# Patient Record
Sex: FEMALE | Race: WHITE | NOT HISPANIC OR LATINO | URBAN - METROPOLITAN AREA
[De-identification: names, ages, dates, MRNs, and addresses within clinical notes are randomized per-mention and may not be internally consistent; named-entity substitution may affect disease eponyms.]

---

## 2018-11-16 ENCOUNTER — OFFICE (OUTPATIENT)
Dept: URBAN - METROPOLITAN AREA CLINIC 67 | Facility: CLINIC | Age: 73
End: 2018-11-16

## 2018-11-16 VITALS — SYSTOLIC BLOOD PRESSURE: 145 MMHG | DIASTOLIC BLOOD PRESSURE: 82 MMHG | WEIGHT: 107 LBS | HEIGHT: 60 IN

## 2018-11-16 DIAGNOSIS — D12.2 BENIGN NEOPLASM OF ASCENDING COLON: ICD-10-CM

## 2018-11-16 DIAGNOSIS — K86.2 CYST OF PANCREAS: ICD-10-CM

## 2018-11-16 PROCEDURE — 99203 OFFICE O/P NEW LOW 30 MIN: CPT | Performed by: INTERNAL MEDICINE

## 2018-11-16 NOTE — SERVICEHPINOTES
The patient is a lissa 73-year-old female originally from [place], of [place] ancestry, who was been referred for surveillance colonoscopy--a 1.2 cm AC adenoma was removed.  She also has a pancreatic cyst, seen in 5820-7164 that was 1.6 cm She herself has not had any attacks of pancreatitis nor does she have ongoing symptoms suggestive of chronic pancreatitis. She does have a hx of smoking. We evaluated more carefully with endoscopic ultrasound, which found a benign unilocular cyst 1.7 x 0.8 cm, an MRI in 10/2017 showed no change. Due to her hx of polyps a colonoscopy will be scheduled, and an EUS in the same setting.  The patient is at 110 lbs, down from 130 lbs three years ago due to the Dr. Kelley diet (eating only one meal a day).

## 2022-11-02 ENCOUNTER — HOSPITAL ENCOUNTER (INPATIENT)
Dept: HOSPITAL 54 - ER | Age: 77
LOS: 3 days | Discharge: NURSING, REHAB OR LONG-TERM CARE | DRG: 542 | End: 2022-11-05
Attending: INTERNAL MEDICINE | Admitting: NURSE PRACTITIONER
Payer: MEDICARE

## 2022-11-02 VITALS — BODY MASS INDEX: 23.56 KG/M2 | HEIGHT: 60 IN | WEIGHT: 120 LBS

## 2022-11-02 DIAGNOSIS — Z74.09: ICD-10-CM
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
## 2022-11-02 DIAGNOSIS — I10: ICD-10-CM

## 2022-11-02 DIAGNOSIS — J44.9: ICD-10-CM

## 2022-11-02 DIAGNOSIS — Z91.81: ICD-10-CM

## 2022-11-02 DIAGNOSIS — Z88.8: ICD-10-CM

## 2022-11-02 DIAGNOSIS — E78.5: ICD-10-CM

## 2022-11-02 DIAGNOSIS — Z88.1: ICD-10-CM

## 2022-11-02 DIAGNOSIS — M48.55XA: Primary | ICD-10-CM

## 2022-11-02 DIAGNOSIS — N17.0: ICD-10-CM

## 2022-11-02 DIAGNOSIS — F32.A: ICD-10-CM

## 2022-11-02 DIAGNOSIS — R26.89: ICD-10-CM

## 2022-11-02 DIAGNOSIS — Z79.899: ICD-10-CM

## 2022-11-02 DIAGNOSIS — M40.205: ICD-10-CM

## 2022-11-02 DIAGNOSIS — Z87.891: ICD-10-CM

## 2022-11-02 DIAGNOSIS — K21.9: ICD-10-CM

## 2022-11-02 LAB
BASOPHILS # BLD AUTO: 0.1 K/UL (ref 0–0.2)
BASOPHILS NFR BLD AUTO: 0.7 % (ref 0–2)
BUN SERPL-MCNC: 24 MG/DL (ref 7–18)
CALCIUM SERPL-MCNC: 9.4 MG/DL (ref 8.5–10.1)
CHLORIDE SERPL-SCNC: 101 MMOL/L (ref 98–107)
CO2 SERPL-SCNC: 29 MMOL/L (ref 21–32)
CREAT SERPL-MCNC: 1.3 MG/DL (ref 0.6–1.3)
EOSINOPHIL NFR BLD AUTO: 0.6 % (ref 0–6)
GLUCOSE SERPL-MCNC: 104 MG/DL (ref 74–106)
HCT VFR BLD AUTO: 35 % (ref 33–45)
HGB BLD-MCNC: 11.3 G/DL (ref 11.5–14.8)
LYMPHOCYTES NFR BLD AUTO: 0.8 K/UL (ref 0.8–4.8)
LYMPHOCYTES NFR BLD AUTO: 9 % (ref 20–44)
MCHC RBC AUTO-ENTMCNC: 32 G/DL (ref 31–36)
MCV RBC AUTO: 89 FL (ref 82–100)
MONOCYTES NFR BLD AUTO: 0.6 K/UL (ref 0.1–1.3)
MONOCYTES NFR BLD AUTO: 6.5 % (ref 2–12)
NEUTROPHILS # BLD AUTO: 7.2 K/UL (ref 1.8–8.9)
NEUTROPHILS NFR BLD AUTO: 83.2 % (ref 43–81)
PLATELET # BLD AUTO: 242 K/UL (ref 150–450)
POTASSIUM SERPL-SCNC: 3.9 MMOL/L (ref 3.5–5.1)
RBC # BLD AUTO: 3.95 MIL/UL (ref 4–5.2)
SODIUM SERPL-SCNC: 137 MMOL/L (ref 136–145)
WBC NRBC COR # BLD AUTO: 8.7 K/UL (ref 4.3–11)

## 2022-11-02 PROCEDURE — G0378 HOSPITAL OBSERVATION PER HR: HCPCS

## 2022-11-02 PROCEDURE — C9803 HOPD COVID-19 SPEC COLLECT: HCPCS

## 2022-11-02 RX ADMIN — LIDOCAINE SCH EA: 50 PATCH CUTANEOUS at 20:27

## 2022-11-02 NOTE — NUR
PT BIBRA C/O TAILBONE PAIN S/P TRIP AND FALL WHILE TRYING TO TURN ON LAMP. PT 
AAOX4 BREATHING EVENLY AND UNLABORED. PT REPORTS FALLING ON HER TAILBONE. PT 
USUALLY WALKS WITH A WALKER. - KO. PT ATTACHED TO MONITOR AND POX. MD AT 
BEDSIDE. WILL CONTINUE TO MONITOR

## 2022-11-03 VITALS — SYSTOLIC BLOOD PRESSURE: 140 MMHG | DIASTOLIC BLOOD PRESSURE: 69 MMHG

## 2022-11-03 VITALS — DIASTOLIC BLOOD PRESSURE: 50 MMHG | SYSTOLIC BLOOD PRESSURE: 110 MMHG

## 2022-11-03 VITALS — SYSTOLIC BLOOD PRESSURE: 151 MMHG | DIASTOLIC BLOOD PRESSURE: 74 MMHG

## 2022-11-03 LAB
BASOPHILS # BLD AUTO: 0 K/UL (ref 0–0.2)
BASOPHILS NFR BLD AUTO: 0.8 % (ref 0–2)
BUN SERPL-MCNC: 26 MG/DL (ref 7–18)
CALCIUM SERPL-MCNC: 8.8 MG/DL (ref 8.5–10.1)
CHLORIDE SERPL-SCNC: 104 MMOL/L (ref 98–107)
CO2 SERPL-SCNC: 30 MMOL/L (ref 21–32)
CREAT SERPL-MCNC: 1.5 MG/DL (ref 0.6–1.3)
EOSINOPHIL NFR BLD AUTO: 2.5 % (ref 0–6)
GLUCOSE SERPL-MCNC: 87 MG/DL (ref 74–106)
HCT VFR BLD AUTO: 30 % (ref 33–45)
HGB BLD-MCNC: 9.6 G/DL (ref 11.5–14.8)
LYMPHOCYTES NFR BLD AUTO: 1 K/UL (ref 0.8–4.8)
LYMPHOCYTES NFR BLD AUTO: 18.4 % (ref 20–44)
MAGNESIUM SERPL-MCNC: 1.9 MG/DL (ref 1.8–2.4)
MCHC RBC AUTO-ENTMCNC: 32 G/DL (ref 31–36)
MCV RBC AUTO: 89 FL (ref 82–100)
MONOCYTES NFR BLD AUTO: 0.6 K/UL (ref 0.1–1.3)
MONOCYTES NFR BLD AUTO: 11.3 % (ref 2–12)
NEUTROPHILS # BLD AUTO: 3.5 K/UL (ref 1.8–8.9)
NEUTROPHILS NFR BLD AUTO: 67 % (ref 43–81)
PHOSPHATE SERPL-MCNC: 3.9 MG/DL (ref 2.5–4.9)
PLATELET # BLD AUTO: 209 K/UL (ref 150–450)
POTASSIUM SERPL-SCNC: 3.5 MMOL/L (ref 3.5–5.1)
RBC # BLD AUTO: 3.31 MIL/UL (ref 4–5.2)
SODIUM SERPL-SCNC: 139 MMOL/L (ref 136–145)
WBC NRBC COR # BLD AUTO: 5.3 K/UL (ref 4.3–11)

## 2022-11-03 RX ADMIN — LIDOCAINE SCH EA: 50 PATCH CUTANEOUS at 20:45

## 2022-11-03 RX ADMIN — SODIUM CHLORIDE PRN MLS/HR: 9 INJECTION, SOLUTION INTRAVENOUS at 09:42

## 2022-11-03 RX ADMIN — SODIUM CHLORIDE PRN MLS/HR: 9 INJECTION, SOLUTION INTRAVENOUS at 22:04

## 2022-11-03 RX ADMIN — BUPROPION HYDROCHLORIDE SCH MG: 150 TABLET, EXTENDED RELEASE ORAL at 10:25

## 2022-11-03 RX ADMIN — GABAPENTIN SCH MG: 100 CAPSULE ORAL at 10:24

## 2022-11-03 RX ADMIN — FOLIC ACID SCH MG: 1 TABLET ORAL at 10:24

## 2022-11-03 RX ADMIN — GABAPENTIN SCH MG: 100 CAPSULE ORAL at 04:15

## 2022-11-03 RX ADMIN — TRAZODONE HYDROCHLORIDE SCH MG: 50 TABLET ORAL at 22:05

## 2022-11-03 RX ADMIN — ATORVASTATIN CALCIUM SCH MG: 10 TABLET, FILM COATED ORAL at 22:05

## 2022-11-03 RX ADMIN — PANTOPRAZOLE SODIUM SCH MG: 40 TABLET, DELAYED RELEASE ORAL at 07:49

## 2022-11-03 RX ADMIN — ATORVASTATIN CALCIUM SCH MG: 10 TABLET, FILM COATED ORAL at 04:15

## 2022-11-03 RX ADMIN — URSODIOL SCH MG: 300 CAPSULE ORAL at 11:10

## 2022-11-03 RX ADMIN — TRAZODONE HYDROCHLORIDE SCH MG: 50 TABLET ORAL at 00:00

## 2022-11-03 RX ADMIN — LOSARTAN POTASSIUM SCH MG: 50 TABLET, FILM COATED ORAL at 09:00

## 2022-11-03 RX ADMIN — CITALOPRAM SCH MG: 20 TABLET ORAL at 10:24

## 2022-11-03 NOTE — NUR
MS RN CLOSING NOTE



PT IN BED, AWAKE, SLEEPING TV AT THIS TME. A/O X3-4. ABLE TO MAKE NEEDS KNOWN. STABLE ON O2 
2 LPM VIA NC. NO SIGNS OF SOB OR LABORED BREATHING. IV LAC #20 RUNNING NS @ 75 ML/HR. 
ADMINISTERED PRESCRIBED MEDICATIONS AND TOLERATED WELL. SAFETY MEASURES MAINTAINED: BED 
LOCKED AND IN LOW POSITION; SIDE RAILS UP X3; CALL LIGHT WITHIN REACH. WILL ENDORSE PARVEEN TO 
NIGHT SHIFT NURSE.

## 2022-11-03 NOTE — NUR
MS RN ADMISSION NOTES



PT ADMITTED TO UNIT VIA [facility] AT 0900 WITH DIAGNOSIS OF INTRACTABLE BACK PAIN. PT IS A/O 
X4. ABLE TO MAKE NEEDS KNOWN. ORIENTED TO STAFF AND ROOM. V/S TAKEN AND RECORDED. PT ON 02 
VIA N/C AT 2LPM, TOLERATING WELL, BREATHING EVEN AND UNLABORED, NO ACUTE RESPIRATORY 
DISTRESS NOTED. SKIN IS INTACT.  LOWER LUMBAR MIDLINE MILD TENDERNESS NOTED WITH LIDOCAINE 
PATCH IN PLACE. IV ACCESS NOTED ON LAC #20G INTACT AND PATENT, IVF OF NS @ 75ML/HR STARTED 
PER MD ORDER. LUNGS CLEAR ON AUSCULTATION. ABDOMEN SOFT, NON-TENDER WITH POSITIVE BOWEL 
SOUNDS PRESENT ON FOUR QUADRANTS. SAFETY PRECAUTIONS IMPLEMENTED: BED IN LOWEST LOCKED 
POSITION, SIDE RAILS UP X2 CALL LIGHT AND TRAY TABLE PLACED WITHIN W/I EASY REACH OF PT. 
WILL CONTINUE TO MONITOR PT

## 2022-11-03 NOTE — NUR
RN NOTES



CALLED [facility] [place] FOR PT'S LUMBAR BACK BRACE PER MD ORDER. OFFICE IS 
CLOSED AND LEFT A MESSAGE. FAXED ORDER TO CENTER AND RECEIVED CONFIRMATION THAT IT WAS 
RECEIVED.

## 2022-11-03 NOTE — NUR
RN OPENING NOTES



RECEIVED PT LAYING IN BED, ASLEEP, AWAKENS TO VERBAL STIMULI. AOx4, ABLE TO MAKE NEEDS 
KNOWN. ON RA AND TOLERATING WELL. NO SOB NOTED. NO S/SX OF RESPIRATORY DISTRESS NOTED. IV 
ACCESS IN LAC #20G RUNNING NS @ 75 ML/HR. SAFETY PRECAUTIONS IN PLACE: BED IN LOWEST, LOCKED 
IN POSITION, SIDERAILS UPx2, AND BRAKES ON. TABLE AND CALL LIGHT WITHIN REACH. ALL NEEDS MET 
AT THIS TIME.

## 2022-11-04 VITALS — SYSTOLIC BLOOD PRESSURE: 154 MMHG | DIASTOLIC BLOOD PRESSURE: 81 MMHG

## 2022-11-04 VITALS — DIASTOLIC BLOOD PRESSURE: 79 MMHG | SYSTOLIC BLOOD PRESSURE: 138 MMHG

## 2022-11-04 VITALS — DIASTOLIC BLOOD PRESSURE: 75 MMHG | SYSTOLIC BLOOD PRESSURE: 159 MMHG

## 2022-11-04 VITALS — DIASTOLIC BLOOD PRESSURE: 76 MMHG | SYSTOLIC BLOOD PRESSURE: 161 MMHG

## 2022-11-04 RX ADMIN — GABAPENTIN SCH MG: 100 CAPSULE ORAL at 09:28

## 2022-11-04 RX ADMIN — PANTOPRAZOLE SODIUM SCH MG: 40 TABLET, DELAYED RELEASE ORAL at 09:27

## 2022-11-04 RX ADMIN — FOLIC ACID SCH MG: 1 TABLET ORAL at 09:28

## 2022-11-04 RX ADMIN — BUPROPION HYDROCHLORIDE SCH MG: 150 TABLET, EXTENDED RELEASE ORAL at 09:28

## 2022-11-04 RX ADMIN — LOSARTAN POTASSIUM SCH MG: 50 TABLET, FILM COATED ORAL at 09:28

## 2022-11-04 RX ADMIN — Medication PRN OZ: at 11:59

## 2022-11-04 RX ADMIN — CITALOPRAM SCH MG: 20 TABLET ORAL at 09:28

## 2022-11-04 RX ADMIN — URSODIOL SCH MG: 300 CAPSULE ORAL at 09:28

## 2022-11-04 RX ADMIN — SODIUM CHLORIDE PRN MLS/HR: 9 INJECTION, SOLUTION INTRAVENOUS at 21:08

## 2022-11-04 RX ADMIN — PANTOPRAZOLE SODIUM SCH MG: 40 TABLET, DELAYED RELEASE ORAL at 07:30

## 2022-11-04 RX ADMIN — Medication PRN OZ: at 12:00

## 2022-11-04 RX ADMIN — ATORVASTATIN CALCIUM SCH MG: 10 TABLET, FILM COATED ORAL at 21:15

## 2022-11-04 RX ADMIN — TRAZODONE HYDROCHLORIDE SCH MG: 50 TABLET ORAL at 21:16

## 2022-11-04 RX ADMIN — Medication SCH OZ: at 12:02

## 2022-11-04 NOTE — NUR
WOUND CARE CONSULT: PT SEEN FOR SKIN ASSESSMENT AND NOTED TO HAVE SOME DISCOLORATION TO 
SACRAL/BUTTOCKS REGION, PRESENT ON ADMISSION. PT ABLE TO ASSIST WITH TURNING AND 
REPOSITIONING IN BED BUT HAS SOME SORENESS ON RT SIDE. PT ASKING WHEN SHE WILL BE GOING BACK 
TO HER ROOM. PT REORIENTED. DISCUSSED WITH NURSING STAFF AND CHARGE RN. DISCUSSED SKIN 
PROTECTION WITH NURSING STAFF. MD IN AGREEMENT WITH PLAN OF CARE.

## 2022-11-04 NOTE — NUR
ATILIO  NOTES



PANTOPRAZOLE  NOTE  GIVEN  AS  PATIENT HAS A PROCEDURE  AND  ON NPO  AT THIS TIME 

-------------------------------------------------------------------------------

Addendum: 11/04/22 at 0924 by NATHAN HURTADO RN

-------------------------------------------------------------------------------

WRONG  DOCUMENTATION   PATIENT  NOT ON NPO  MEDS  GIVEN  AS ORDERED

## 2022-11-04 NOTE — NUR
Consult 



KERON received a consult request a fall at home. Pt. is a 77-year-old white female who was 
admitted for back pain. KERON met with pt. at bedside. Pt. is alert and oriented x4 and 
appeared well groomed with a full affect. KERON confirmed pt. contact information and confirmed 
that her emergency contact is her neighbor, Wally. Pt. lives alone with her cat and does not 
have a caregiver. Per pt. report she wants to go to a longterm but wants her neighbor to assist 
her in connecting her with an longterm. Pt. reports that she is independent with her ADLs and 
her neighbor Wally helps her feed her cat and clean her home. Pt. reports she uses a walker. 
Pt. receives [employer] and SSI. Pt. states that she was a nicotine smoker for 40 years but 
has stopped and does not drink alcohol due to medication. Pt. stated that she feels 
depressed and is receiving medication for it (Bupropion and citalopram). Pt. denies having 
hallucinations. KERON assessed for suicidal and homicidal ideation in which pt. denied plan, 
means, or intent. .



DC plan: When KERON asked pt.s plan after being discharged, pt. responded [place] 
[place] [address] [place] [address]. KERON offered pt. longterm and SNF placement in 
which the pt. refused due to stating that her neighbor Wally was helping her with placement. 
KERON encouraged pt. to connect with an JORGE LUIS. KERON offered pt. with a senior resource guide, 
mental health resources, caregiving guides, and transportation resources in which pt. 
accepted them. KERON discussed with nurse. 



ABUSE PREVENTION: 

ELDER ABUSE HOTLINE (24/7) [phone]

ADULT PROTECTIVE SERVICES HOTLINE [phone]

LONG-TERM CARE [facility] [phone]  [facility]

AREA ON AGING (HOTLINE) [phone]



ADULT DAY HEALTH CARE CARE CENTERS: 

Private pay or Medi-kurt funded adult day care

 [facility] [phone]

 [facility] [phone], [facility] [phone], [facility] [phone], [facility] Adult Day Health Care [phone], [place]

[facility] Day Health Care [phone], [facility]  [facility] [phone], [place]

[facility] [phone], [place]

[facility] Adult Center [phone], [place]



ALZHEIMERS DISEASE/DEMENTIA: 

Alzheimers Association Helpline (436) 488-4494

 [facility] (060) 010-1967 www.alz.org/[facility] Department of Aging (682) 324-0868 www.lacity.org

 Family Caregiver [place] (946) 197-6466 www.caregiver.org

 [place] Caregiver Resources Center/Family Support (056) 049-9452 www.[facility].org

CANCER RESOURCES: 

American Cancer Society (036) 590-7338 www.cancer.org

 Cancer Support Community (443) 574-2645 www.CancerSupportVvsb.org: CancerCare (673) 466-2656 www.cancercare.org

 [facility] Cancer Support Center (839) 085-2725 www.[facility].org



COMMUNITY HEALTH ASSOCIATIONS:

AARP (636) 096-7585 www.aarp.org

 ALS Association (597) 207-2038(612) 977-1204 (717) 917-4756 (ask for Tatiana) www.als.org

 American Diabetes Association (814) 697-9170 www.diabetes.org

 American Heart Association (831) 176-2952 www.heart.org

 American Lung Association (278) 884-1827 www.lungusa.org

 American Parkinson Disease Association (874) 258-3609 www.apdaparkinson.org

 American [place] (267) 746-8534, (562) 620-2674 www.redcross.org

 Arthritis Foundation (327) 052-1656 www.arthritis.org

 Crohns & Colitis Foundation of American (202) 536-4620 www.ccfa.org/chapters/earle

 National Multiple Sclerosis Society (470) 621-5997 www.nationalmssociety.org

 Myasthenia Gravis Foundation (943) 003-8814 www.myasthenia-ca.org

 National Stroke Association (449) 933-8334 www.stroke.org



CONSERVATORSHIP & GUARDIANSHIP:

AARP (939) 311-1725

 [place] Legal Services (679) 620-7560

 Center for Health Care Rights (192) 745-7610

 Eldercare Information and Referral (003) 018-7661

  Foundation (620) 317-2020



[facility]: 

[facility] Referral Service [phone]

[facility] Legal Services [phone]

Office of the Public Guardian [place] [phone]



EYESIGHT DISORDER RESOURCES:

American Macular Degeneration Foundation (414) 142-2663

[facility] (739) 766-2966 www.[facility].org



GRIEF AND BEREAVEMENT RESOURCES:

 [facility] [phone], [facility] [phone]

 THE [facility] [phone], [facility] [phone]

[facility] [phone], [place]



HEARING DISORDER RESOURCES:

[place] Telephone Access Program Deaf and Disabled Telecommunications Program (854) 807-9305 www.ddtp.[facility].ca.gov

HearRx Hearing Centers ([place]) (607) 326-2412

Better Hearing Systems [phone] 179-5679, [place]

GLAD ([facility] Agency on Deafness) (343) 839-5678 V/ TTY;

Hearing Aid Specialist [phone], [facility] -low income hearing aid assistance (898) 287-0194 
www.Cord Projecthearingfoundation.org 

[facility] [phone], [place]



HELP AT HOME  CAREGIVER SUPPORT:

 In Home Support Services  (Must have Medi-Kurt to be eligible)

([phone] 962-9168 *Ask for a list of agencies that provide services to assist with care in the 
home.  Local Senior Centers also have listings of care providers.



HOME SAFETY MODIFICATIONS AND EQUIPMENT:

Senior centers have additional referrals.

[place] Housing and Community Investment Dept. Handyworker Program (low income) (992) 607-9700 or 
(890) 165-3902 Visit http://hcidla.lacity.org/low-income-sr for more information

National Seating and Mobility (437)430-6725 and/or (210)990-6947;

Forever Active (046) 930-1811 www.foreveractivemed.com

Stay Home Safe (625) 790-8285  www.Stayhomesafe.[employer]ation Biotechnologies

LIFE ALERT RESPONSE SYSTEM:

Ideal Binary Services 495-154-0930 www. stylefruits

Life Alert 276-172-5799 www.RamTiger Fitness

Life Station 983-689-7716 www.Triplify.[employer]otechnologies

Safe Return 522-790-4615 www.alz.or/safereturn

Cell Phones for Seniors www.nlygz0hjdmkzsunf.com



MEALS AND FOOD PROGRAMS:

[place] Meals on Wheels [phone]

[place] Meals on Wheels [phone]

[facility] [phone]

[place] to the Homebound [phone]

[place] to the Homebound [phone]

[facility] to the Homebound [phone]

[facility] to the Homebound [phone]

[facility] [phone]

[facility] [phone]

ONE Generation [phone]

[facility] [phone]

[facility] [phone]

Meals on Wheels [phone] For all ages: $6.85/ meal w side. Delivered M-F from 10 am-1pm. 
Application and payment is done over the phone. Frozen meals available for weekends. 

Emergency Food Coalition [phone] [phone]

[facility]  [phone]

[facility] [phone]

[facility] Brown bag lunches [phone]

[facility] [phone]



MEAL/GROCERY DELIVERY PROGRAMS:

[facility] Gourmet Meals [phone]- [facility]

[phone]- [facility]

Magic Kitchen [phone]

Moms Meals [phone] (ask Andrea for Discount

***Select grocery stores may provide delivery. 



MEDICAL INSURANCE SUPPORT SERVICES:

Center for Health Care Rights [phone]

Health Insurance Counseling/Advocacy Programs (HICAP)-Must have Medicare. Offers counseling 
for Medi-Kurt eligibility [phone]

Department of Public  703-492-0174 www.[facility].ca.gov

Medicare 217-071-7190 www.socialsecurity.org

Social Security 134-633-4145



SENIOR ACTIVITY PROGRAMS:

*Contact a local senior center, adult school, recreation facility or community college for 
education, fitness, recreation, and social programs. 

Aquatic Therapy and Adapted Exercise programs through [facility] [phone]

Encore at [facility] [phone] www.[facility]/encore

U- Senior Friends [phone]

[place] Senior Programs 508-574-2199 www.oasisnet.org

Suddenly 65 [phone] www.phgemexj15.com



SENIOR CENTERS:

[facility] [phone]

[facility] [phone]

[facility] [phone]

[facility] [phone]

[facility] [phone]

[facility] [phone]

[facility] [phone]

[facility] [phone]

One Generation, [facility] [phone]

[facility] [phone]

[facility] [phone]

[facility] [phone]

[facility] [phone]



TRANSPORTATION:

Local [facility] Centers may have applications for transportation programs and additional 
resources. 

ACCESS Services [phone] Transportation for seniors and disabled persons 7 days a week 

requiring 254 hr. advance reservation. Must apply and register for program erika eligible. 

CITY RIDE 243-372-3146 or [phone] Transportation for seniors and persons with ADA 
card/metro disabled card in the [facility]. M-F only. Must register for services. 

ONE GENERATION  541.424.4122 Serves 65 years + in conjunction with city ride program. Must 
be registered with both programs.

A to B Transport [phone] Provides wheelchair/gurney van service.

Adult Medical Transport [phone] Accepts [facility]-kurt with prior authorization. 

Care Van [phone] Provides wheelchair Transport. 

City Wide Transportation [phone] Provides gurney service

Gentle Care [phone] Gurney Transport.

[facility] Transportation [phone] wheelchair & gurney transport

[facility] Transportation [phone] wheelchair & gurney transport

[place] Non-Emergency Transport [phone] wheelchair & gurney transport

[facility] [place] [phone] Short Term Transportation primarily for adults with   
disabilities on social security income. Nominal fee may apply and a reservation is required. 


City Cab [phone] or [phone]

[facility] [phone]

[address] [place] [address] Referral Services -[phone] For additional programs & services 

VETERANS RESOURCES:

Submissions for Aid and Attendance should be done directly to [facility] office locatd at : 
[address] [address] [phone]

National Caregiver Support Line [phone]

[facility] Veterans Services Field Office [phone]

[place] Department of  Affairs [phone]

Pension Information [phone]

## 2022-11-04 NOTE — NUR
RN CLOSING NOTES



PT LAYING IN BED, ASLEEP, AWAKENS TO VERBAL STIMULI. AOx4, ABLE TO MAKE NEEDS KNOWN. ON RA 
AND TOLERATING WELL. NO SOB NOTED. NO S/SX OF RESPIRATORY DISTRESS NOTED. IV ACCESS IN LAC 
#20G RUNNING NS @ 75 ML/HR. ALL ORDERS CARRIED OUT. ALL NEEDS MET. PT KEPT CLEAN AND DRY. 
SAFETY PRECAUTIONS IN PLACE: BED IN LOWEST, LOCKED IN POSITION, SIDERAILS UPx2, AND BRAKES 
ON. TABLE AND CALL LIGHT WITHIN REACH. WILL ENDORSE TO ONCOMING SHIFT FOR PARVEEN.

## 2022-11-04 NOTE — NUR
RN  CLOSING   NOTES



 PATIENT  ON BED AWAKE . AOx3 , ABLE TO MAKE NEEDS KNOWN. ON RA AND TOLERATING WELL. NO SOB 
NOTED. NO S/S NOTED. NO C/O  OF PAIN AND DISCOMFORT  , ALL DUE MEDS  GIVEN  AS ORDERED, SEEN 
  BY WOUND  NURSE  AND  WITH ORDER   OF  Z  GUARD  ,  BACK  BRACE   WAS  DELIVERED  FROM  
avox  ,  SEEN  BY  PT  AND  ABLE  TO  AMBULATE  WITH  FWW  FRO M BED  TO  BATHROOM 
 ,    IV ACCESS IN LAC #20G RUNNING NS @ 75 ML/HR. SAFETY PRECAUTIONS IN PLACE: BED IN 
LOWEST, LOCKED IN POSITION, SIDERAILS UPx2, AND BRAKES ON. TABLE AND CALL LIGHT WITHIN 
REACH. ALL NEEDS MET AT THIS TIME.

## 2022-11-04 NOTE — NUR
RN OPENING NOTES



RECEIVED PT  IN BED, ASLEEP, AWAKENS TO VERBAL STIMULI. AOx3 , ABLE TO MAKE NEEDS KNOWN. ON 
RA AND TOLERATING WELL. NO SOB NOTED. NO S/S NOTED. NO C/O  OF PAIN AND DISCOMFORT  ,  IV 
ACCESS IN LAC #20G RUNNING NS @ 75 ML/HR. SAFETY PRECAUTIONS IN PLACE: BED IN LOWEST, LOCKED 
IN POSITION, SIDERAILS UPx2, AND BRAKES ON. TABLE AND CALL LIGHT WITHIN REACH. ALL NEEDS MET 
AT THIS TIME.

## 2022-11-04 NOTE — NUR
RECEIVED PATIENT IN BED, ALERT/ORIENTED X4, 2LPM VIA NC, NO COMPLAIN OF BACK PAIN, 
REPOSITIONED FOR COMFORT, KEPT SAFE, WILL CONTINUE TO MONITOR.

## 2022-11-05 VITALS — DIASTOLIC BLOOD PRESSURE: 72 MMHG | SYSTOLIC BLOOD PRESSURE: 148 MMHG

## 2022-11-05 VITALS — SYSTOLIC BLOOD PRESSURE: 148 MMHG | DIASTOLIC BLOOD PRESSURE: 72 MMHG

## 2022-11-05 RX ADMIN — PANTOPRAZOLE SODIUM SCH MG: 40 TABLET, DELAYED RELEASE ORAL at 10:06

## 2022-11-05 RX ADMIN — Medication SCH OZ: at 14:32

## 2022-11-05 RX ADMIN — CITALOPRAM SCH MG: 20 TABLET ORAL at 10:03

## 2022-11-05 RX ADMIN — FOLIC ACID SCH MG: 1 TABLET ORAL at 10:03

## 2022-11-05 RX ADMIN — URSODIOL SCH MG: 300 CAPSULE ORAL at 10:02

## 2022-11-05 RX ADMIN — LOSARTAN POTASSIUM SCH MG: 50 TABLET, FILM COATED ORAL at 10:03

## 2022-11-05 RX ADMIN — BUPROPION HYDROCHLORIDE SCH MG: 150 TABLET, EXTENDED RELEASE ORAL at 10:03

## 2022-11-05 RX ADMIN — GABAPENTIN SCH MG: 100 CAPSULE ORAL at 10:03

## 2022-11-05 NOTE — NUR
PATIENT IN BED, ALERT/ORIENTED X3, WITH CONFUSION, FORGETFUL. 2LPM VIA NC, NO RESPIRATORY 
DISTRESS, S/P FALL AT HOME WITH LOW BACK PAIN, NO COMPLAIN OF PAIN AT REST, LUMBAR BRACE, 
AMBULATES USING FWW WITH ASSISTANCE, CONTINUE HYDRATION, NS AT 75 ML/HR, FALL PRECAUTION, 
KEPT SAFE, WILL CONTINUE TO MONITOR.

## 2022-11-05 NOTE — NUR
RN   DISCHARGE  NOTES



 PT  IN BED, AWAKE. AOx3 , ABLE TO MAKE NEEDS KNOWN. ON RA AND TOLERATING WELL. NO SOB  OR 
DISTRESS   NOTED  . NO C/O  OF PAIN AND DISCOMFORT  , ALL   DUE  MEDS GIVEN  AS ORDERED  ,   
WITH ORDER  FOR  DISCHARGE  TO  [facility]  REHAB  AND   DISCHARGE  PAPERS WERE 
PREPARED   AND INSTRUCTIONS   PROVIDED   TO  THE  PATIENT  AND UNDERSTOOD  ,  REPORT GIVEN  
TO  THE  RN  IN  FACILITY  - MICHELLE TRIMBLE  .  ALL BELONGINGS  WERE  ACCOUNTED  FOR  AND  FORM 
WAS  SIGNED  BY THE  PATIENT  ,  ALSO  REPORT  GIVEN TO THE  AMBULANCE PERSONNEL  AND  
PATIENT LEFT  WITH  NO  SOB OR DISTRESS  NOTED  AND  NO  C/O  OF PAIN AND  DISCOMFORT  ,  
SHASHI  VIA  AMBULANCE   ACCOMPANIED  BY  AMBULANCE PERSONNEL  ,  IV  ACCESS  WAS  RMEOVED  
AND  AND ID BAND   .  PATIENT   LEFT  MEDICALLY STABLE

## 2022-11-05 NOTE — NUR
RN OPENING NOTES



RECEIVED PT  IN BED, AWAKE. AOx3 , ABLE TO MAKE NEEDS KNOWN. ON RA AND TOLERATING WELL. NO 
SOB  OR DISTRESS   NOTED  . NO C/O  OF PAIN AND DISCOMFORT  ,  IV ACCESS IN RIGHT   HAND   
#22  G RUNNING NS @ 75 ML/HR. SAFETY PRECAUTIONS IN PLACE: BED IN LOWEST, LOCKED IN 
POSITION, SIDERAILS UPx2, AND BRAKES ON. TABLE AND CALL LIGHT WITHIN REACH. ALL NEEDS MET AT 
THIS TIME. WILL  CONTINUE  TO MONITOR

## 2023-06-27 ENCOUNTER — HOSPITAL ENCOUNTER (INPATIENT)
Dept: HOSPITAL 12 - REHABOV3 | Age: 78
LOS: 16 days | Discharge: HOME HEALTH | DRG: 559 | End: 2023-07-13
Attending: PHYSICAL MEDICINE & REHABILITATION | Admitting: PHYSICAL MEDICINE & REHABILITATION
Payer: MEDICARE

## 2023-06-27 VITALS — WEIGHT: 135 LBS | HEIGHT: 60 IN | BODY MASS INDEX: 26.5 KG/M2

## 2023-06-27 DIAGNOSIS — D68.59: ICD-10-CM

## 2023-06-27 DIAGNOSIS — N39.0: ICD-10-CM

## 2023-06-27 DIAGNOSIS — R26.81: ICD-10-CM

## 2023-06-27 DIAGNOSIS — Z91.81: ICD-10-CM

## 2023-06-27 DIAGNOSIS — K21.9: ICD-10-CM

## 2023-06-27 DIAGNOSIS — Z79.83: ICD-10-CM

## 2023-06-27 DIAGNOSIS — E86.0: ICD-10-CM

## 2023-06-27 DIAGNOSIS — B96.20: ICD-10-CM

## 2023-06-27 DIAGNOSIS — D64.9: ICD-10-CM

## 2023-06-27 DIAGNOSIS — Z88.6: ICD-10-CM

## 2023-06-27 DIAGNOSIS — E78.5: ICD-10-CM

## 2023-06-27 DIAGNOSIS — R13.10: ICD-10-CM

## 2023-06-27 DIAGNOSIS — S32.049D: Primary | ICD-10-CM
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
## 2023-06-27 DIAGNOSIS — I70.0: ICD-10-CM

## 2023-06-27 DIAGNOSIS — S22.079D: ICD-10-CM

## 2023-06-27 DIAGNOSIS — Z87.891: ICD-10-CM

## 2023-06-27 DIAGNOSIS — M80.88XD: ICD-10-CM

## 2023-06-27 DIAGNOSIS — K74.3: ICD-10-CM

## 2023-06-27 DIAGNOSIS — J44.9: ICD-10-CM

## 2023-06-27 DIAGNOSIS — J96.11: ICD-10-CM

## 2023-06-27 DIAGNOSIS — E11.36: ICD-10-CM

## 2023-06-27 DIAGNOSIS — G62.9: ICD-10-CM

## 2023-06-27 DIAGNOSIS — R29.6: ICD-10-CM

## 2023-06-27 DIAGNOSIS — G93.40: ICD-10-CM

## 2023-06-27 DIAGNOSIS — N17.0: ICD-10-CM

## 2023-06-27 DIAGNOSIS — I25.10: ICD-10-CM

## 2023-06-27 DIAGNOSIS — I10: ICD-10-CM

## 2023-06-27 DIAGNOSIS — R19.5: ICD-10-CM

## 2023-06-27 DIAGNOSIS — M19.90: ICD-10-CM

## 2023-06-27 DIAGNOSIS — Z88.8: ICD-10-CM

## 2023-06-27 PROCEDURE — P9016 RBC LEUKOCYTES REDUCED: HCPCS

## 2023-06-28 VITALS — SYSTOLIC BLOOD PRESSURE: 137 MMHG | TEMPERATURE: 98.3 F | DIASTOLIC BLOOD PRESSURE: 66 MMHG | OXYGEN SATURATION: 96 %

## 2023-06-29 VITALS — SYSTOLIC BLOOD PRESSURE: 142 MMHG | DIASTOLIC BLOOD PRESSURE: 70 MMHG | TEMPERATURE: 98.7 F | OXYGEN SATURATION: 97 %

## 2023-06-29 VITALS — TEMPERATURE: 98.7 F | SYSTOLIC BLOOD PRESSURE: 104 MMHG | DIASTOLIC BLOOD PRESSURE: 54 MMHG | OXYGEN SATURATION: 93 %

## 2023-06-29 VITALS — DIASTOLIC BLOOD PRESSURE: 65 MMHG | SYSTOLIC BLOOD PRESSURE: 138 MMHG | OXYGEN SATURATION: 98 % | TEMPERATURE: 98.6 F

## 2023-06-29 VITALS — TEMPERATURE: 98.1 F | SYSTOLIC BLOOD PRESSURE: 158 MMHG | OXYGEN SATURATION: 97 % | DIASTOLIC BLOOD PRESSURE: 54 MMHG

## 2023-06-29 VITALS — DIASTOLIC BLOOD PRESSURE: 63 MMHG | OXYGEN SATURATION: 99 % | TEMPERATURE: 97.7 F | SYSTOLIC BLOOD PRESSURE: 132 MMHG

## 2023-06-29 VITALS — OXYGEN SATURATION: 96 %

## 2023-06-29 RX ADMIN — Medication SCH MG: at 09:15

## 2023-06-29 RX ADMIN — FOLIC ACID SCH MG: 1 TABLET ORAL at 09:15

## 2023-06-29 RX ADMIN — AMLODIPINE BESYLATE SCH MG: 5 TABLET ORAL at 20:07

## 2023-06-29 RX ADMIN — LOSARTAN POTASSIUM SCH MG: 50 TABLET, FILM COATED ORAL at 09:17

## 2023-06-29 RX ADMIN — THERA TABS SCH UDTAB: TAB at 09:15

## 2023-06-29 RX ADMIN — ATORVASTATIN CALCIUM SCH MG: 10 TABLET, FILM COATED ORAL at 20:07

## 2023-06-29 RX ADMIN — GABAPENTIN SCH MG: 100 CAPSULE ORAL at 16:59

## 2023-06-29 RX ADMIN — ASPIRIN SCH MG: 81 TABLET, CHEWABLE ORAL at 09:22

## 2023-06-29 RX ADMIN — GABAPENTIN SCH MG: 100 CAPSULE ORAL at 09:15

## 2023-06-29 RX ADMIN — Medication SCH MG: at 09:13

## 2023-06-29 RX ADMIN — ENOXAPARIN SODIUM SCH MG: 30 INJECTION SUBCUTANEOUS at 12:53

## 2023-06-29 RX ADMIN — Medication PRN MG: at 09:26

## 2023-06-29 RX ADMIN — URSODIOL SCH MG: 300 CAPSULE ORAL at 16:59

## 2023-06-29 RX ADMIN — Medication SCH MG: at 09:14

## 2023-06-29 RX ADMIN — Medication SCH TAB: at 09:16

## 2023-06-29 RX ADMIN — PANTOPRAZOLE SODIUM SCH MG: 40 TABLET, DELAYED RELEASE ORAL at 06:18

## 2023-06-29 RX ADMIN — CITALOPRAM HYDROBROMIDE SCH MG: 20 TABLET, FILM COATED ORAL at 09:14

## 2023-06-29 RX ADMIN — VITAMIN D, TAB 1000IU (100/BT) SCH UNIT: 25 TAB at 09:13

## 2023-06-29 RX ADMIN — FLUTICASONE PROPIONATE SCH GM: 50 SPRAY, METERED NASAL at 09:37

## 2023-06-29 RX ADMIN — GABAPENTIN SCH MG: 100 CAPSULE ORAL at 12:15

## 2023-06-29 RX ADMIN — URSODIOL SCH MG: 300 CAPSULE ORAL at 09:14

## 2023-06-29 RX ADMIN — Medication SCH MG: at 16:59

## 2023-06-30 VITALS — SYSTOLIC BLOOD PRESSURE: 131 MMHG | OXYGEN SATURATION: 96 % | DIASTOLIC BLOOD PRESSURE: 68 MMHG | TEMPERATURE: 98 F

## 2023-06-30 VITALS — OXYGEN SATURATION: 98 % | TEMPERATURE: 98.4 F | DIASTOLIC BLOOD PRESSURE: 70 MMHG | SYSTOLIC BLOOD PRESSURE: 127 MMHG

## 2023-06-30 VITALS — OXYGEN SATURATION: 96 % | DIASTOLIC BLOOD PRESSURE: 63 MMHG | SYSTOLIC BLOOD PRESSURE: 113 MMHG | TEMPERATURE: 98.7 F

## 2023-06-30 VITALS — OXYGEN SATURATION: 96 %

## 2023-06-30 VITALS — DIASTOLIC BLOOD PRESSURE: 56 MMHG | TEMPERATURE: 98.3 F | OXYGEN SATURATION: 96 % | SYSTOLIC BLOOD PRESSURE: 103 MMHG

## 2023-06-30 LAB
ALP SERPL-CCNC: 180 U/L (ref 50–136)
ALT SERPL W/O P-5'-P-CCNC: 34 U/L (ref 14–59)
AST SERPL-CCNC: 23 U/L (ref 15–37)
BILIRUB SERPL-MCNC: 0.3 MG/DL (ref 0.2–1)
BUN SERPL-MCNC: 30 MG/DL (ref 7–18)
CHLORIDE SERPL-SCNC: 106 MMOL/L (ref 98–107)
CO2 SERPL-SCNC: 26 MMOL/L (ref 21–32)
CREAT SERPL-MCNC: 1 MG/DL (ref 0.6–1.3)
HCT VFR BLD AUTO: 25.6 % (ref 31.2–41.9)
MAGNESIUM SERPL-MCNC: 1.7 MG/DL (ref 1.8–2.4)
MCH RBC QN AUTO: 27.9 UUG (ref 24.7–32.8)
MCV RBC AUTO: 87.6 FL (ref 75.5–95.3)
PHOSPHATE SERPL-MCNC: 3.3 MG/DL (ref 2.5–4.9)
PLATELET # BLD AUTO: 245 K/UL (ref 179–408)
POTASSIUM SERPL-SCNC: 4.3 MMOL/L (ref 3.5–5.1)
WS STN SPEC: 5.4 G/DL (ref 6.4–8.2)

## 2023-06-30 RX ADMIN — ATORVASTATIN CALCIUM SCH MG: 10 TABLET, FILM COATED ORAL at 20:56

## 2023-06-30 RX ADMIN — Medication PRN MG: at 15:18

## 2023-06-30 RX ADMIN — GABAPENTIN SCH MG: 100 CAPSULE ORAL at 08:22

## 2023-06-30 RX ADMIN — VITAMIN D, TAB 1000IU (100/BT) SCH UNIT: 25 TAB at 08:23

## 2023-06-30 RX ADMIN — FLUTICASONE PROPIONATE SCH GM: 50 SPRAY, METERED NASAL at 08:24

## 2023-06-30 RX ADMIN — FOLIC ACID SCH MG: 1 TABLET ORAL at 08:22

## 2023-06-30 RX ADMIN — AMLODIPINE BESYLATE SCH MG: 5 TABLET ORAL at 08:22

## 2023-06-30 RX ADMIN — PANTOPRAZOLE SODIUM SCH MG: 40 TABLET, DELAYED RELEASE ORAL at 06:17

## 2023-06-30 RX ADMIN — Medication SCH MG: at 08:23

## 2023-06-30 RX ADMIN — URSODIOL SCH MG: 300 CAPSULE ORAL at 08:26

## 2023-06-30 RX ADMIN — POLYETHYLENE GLYCOL 3350 SCH GM: 17 POWDER, FOR SOLUTION ORAL at 14:40

## 2023-06-30 RX ADMIN — GABAPENTIN SCH MG: 100 CAPSULE ORAL at 12:36

## 2023-06-30 RX ADMIN — URSODIOL SCH MG: 300 CAPSULE ORAL at 16:23

## 2023-06-30 RX ADMIN — ENOXAPARIN SODIUM SCH MG: 30 INJECTION SUBCUTANEOUS at 08:12

## 2023-06-30 RX ADMIN — AMLODIPINE BESYLATE SCH MG: 5 TABLET ORAL at 20:56

## 2023-06-30 RX ADMIN — Medication SCH MG: at 16:21

## 2023-06-30 RX ADMIN — FLUTICASONE FUROATE AND VILANTEROL TRIFENATATE SCH EACH: 100; 25 POWDER RESPIRATORY (INHALATION) at 08:24

## 2023-06-30 RX ADMIN — GABAPENTIN SCH MG: 100 CAPSULE ORAL at 16:21

## 2023-06-30 RX ADMIN — Medication SCH TAB: at 08:22

## 2023-06-30 RX ADMIN — LOSARTAN POTASSIUM SCH MG: 50 TABLET, FILM COATED ORAL at 08:23

## 2023-06-30 RX ADMIN — CITALOPRAM HYDROBROMIDE SCH MG: 20 TABLET, FILM COATED ORAL at 08:22

## 2023-06-30 RX ADMIN — Medication SCH MG: at 08:22

## 2023-06-30 RX ADMIN — ASPIRIN SCH MG: 81 TABLET, CHEWABLE ORAL at 08:23

## 2023-06-30 RX ADMIN — THERA TABS SCH UDTAB: TAB at 08:23

## 2023-07-01 VITALS — OXYGEN SATURATION: 98 % | TEMPERATURE: 98.7 F | SYSTOLIC BLOOD PRESSURE: 117 MMHG | DIASTOLIC BLOOD PRESSURE: 59 MMHG

## 2023-07-01 VITALS — DIASTOLIC BLOOD PRESSURE: 65 MMHG | SYSTOLIC BLOOD PRESSURE: 141 MMHG | TEMPERATURE: 98.4 F | OXYGEN SATURATION: 99 %

## 2023-07-01 VITALS — SYSTOLIC BLOOD PRESSURE: 122 MMHG | DIASTOLIC BLOOD PRESSURE: 57 MMHG | OXYGEN SATURATION: 94 % | TEMPERATURE: 98.3 F

## 2023-07-01 VITALS — OXYGEN SATURATION: 95 % | TEMPERATURE: 98.5 F | SYSTOLIC BLOOD PRESSURE: 114 MMHG | DIASTOLIC BLOOD PRESSURE: 66 MMHG

## 2023-07-01 VITALS — OXYGEN SATURATION: 96 %

## 2023-07-01 RX ADMIN — ASPIRIN SCH MG: 81 TABLET, CHEWABLE ORAL at 08:08

## 2023-07-01 RX ADMIN — Medication SCH MG: at 16:25

## 2023-07-01 RX ADMIN — URSODIOL SCH MG: 300 CAPSULE ORAL at 16:25

## 2023-07-01 RX ADMIN — GABAPENTIN SCH MG: 100 CAPSULE ORAL at 08:14

## 2023-07-01 RX ADMIN — PANTOPRAZOLE SODIUM SCH MG: 40 TABLET, DELAYED RELEASE ORAL at 06:42

## 2023-07-01 RX ADMIN — FLUTICASONE PROPIONATE SCH GM: 50 SPRAY, METERED NASAL at 08:15

## 2023-07-01 RX ADMIN — FLUTICASONE FUROATE AND VILANTEROL TRIFENATATE SCH EACH: 100; 25 POWDER RESPIRATORY (INHALATION) at 08:15

## 2023-07-01 RX ADMIN — Medication SCH MG: at 08:09

## 2023-07-01 RX ADMIN — GABAPENTIN SCH MG: 100 CAPSULE ORAL at 12:33

## 2023-07-01 RX ADMIN — ENOXAPARIN SODIUM SCH MG: 30 INJECTION SUBCUTANEOUS at 08:38

## 2023-07-01 RX ADMIN — URSODIOL SCH MG: 300 CAPSULE ORAL at 08:38

## 2023-07-01 RX ADMIN — ATORVASTATIN CALCIUM SCH MG: 10 TABLET, FILM COATED ORAL at 21:24

## 2023-07-01 RX ADMIN — POLYETHYLENE GLYCOL 3350 SCH GM: 17 POWDER, FOR SOLUTION ORAL at 08:15

## 2023-07-01 RX ADMIN — Medication SCH TAB: at 08:09

## 2023-07-01 RX ADMIN — AMLODIPINE BESYLATE SCH MG: 5 TABLET ORAL at 08:15

## 2023-07-01 RX ADMIN — CITALOPRAM HYDROBROMIDE SCH MG: 20 TABLET, FILM COATED ORAL at 08:14

## 2023-07-01 RX ADMIN — Medication SCH MG: at 08:08

## 2023-07-01 RX ADMIN — GABAPENTIN SCH MG: 100 CAPSULE ORAL at 16:25

## 2023-07-01 RX ADMIN — Medication SCH MG: at 08:14

## 2023-07-01 RX ADMIN — Medication PRN MG: at 08:17

## 2023-07-01 RX ADMIN — VITAMIN D, TAB 1000IU (100/BT) SCH UNIT: 25 TAB at 08:09

## 2023-07-01 RX ADMIN — LOSARTAN POTASSIUM SCH MG: 50 TABLET, FILM COATED ORAL at 08:08

## 2023-07-01 RX ADMIN — FOLIC ACID SCH MG: 1 TABLET ORAL at 08:14

## 2023-07-01 RX ADMIN — AMLODIPINE BESYLATE SCH MG: 5 TABLET ORAL at 21:24

## 2023-07-01 RX ADMIN — THERA TABS SCH UDTAB: TAB at 08:14

## 2023-07-02 VITALS — DIASTOLIC BLOOD PRESSURE: 70 MMHG | TEMPERATURE: 97.6 F | OXYGEN SATURATION: 97 % | SYSTOLIC BLOOD PRESSURE: 132 MMHG

## 2023-07-02 VITALS — OXYGEN SATURATION: 96 %

## 2023-07-02 VITALS — TEMPERATURE: 98.7 F | OXYGEN SATURATION: 96 % | SYSTOLIC BLOOD PRESSURE: 121 MMHG | DIASTOLIC BLOOD PRESSURE: 57 MMHG

## 2023-07-02 VITALS — TEMPERATURE: 98.1 F | SYSTOLIC BLOOD PRESSURE: 101 MMHG | DIASTOLIC BLOOD PRESSURE: 47 MMHG | OXYGEN SATURATION: 98 %

## 2023-07-02 VITALS — TEMPERATURE: 98.1 F | DIASTOLIC BLOOD PRESSURE: 52 MMHG | SYSTOLIC BLOOD PRESSURE: 103 MMHG | OXYGEN SATURATION: 96 %

## 2023-07-02 RX ADMIN — ATORVASTATIN CALCIUM SCH MG: 10 TABLET, FILM COATED ORAL at 20:30

## 2023-07-02 RX ADMIN — VITAMIN D, TAB 1000IU (100/BT) SCH UNIT: 25 TAB at 08:25

## 2023-07-02 RX ADMIN — Medication SCH MG: at 16:37

## 2023-07-02 RX ADMIN — FLUTICASONE FUROATE AND VILANTEROL TRIFENATATE SCH EACH: 100; 25 POWDER RESPIRATORY (INHALATION) at 08:34

## 2023-07-02 RX ADMIN — POLYETHYLENE GLYCOL 3350 SCH GM: 17 POWDER, FOR SOLUTION ORAL at 08:32

## 2023-07-02 RX ADMIN — Medication PRN MG: at 14:37

## 2023-07-02 RX ADMIN — GABAPENTIN SCH MG: 100 CAPSULE ORAL at 16:37

## 2023-07-02 RX ADMIN — AMLODIPINE BESYLATE SCH MG: 5 TABLET ORAL at 08:26

## 2023-07-02 RX ADMIN — LOSARTAN POTASSIUM SCH MG: 50 TABLET, FILM COATED ORAL at 08:33

## 2023-07-02 RX ADMIN — CITALOPRAM HYDROBROMIDE SCH MG: 20 TABLET, FILM COATED ORAL at 08:25

## 2023-07-02 RX ADMIN — URSODIOL SCH MG: 300 CAPSULE ORAL at 08:32

## 2023-07-02 RX ADMIN — AMLODIPINE BESYLATE SCH MG: 5 TABLET ORAL at 20:30

## 2023-07-02 RX ADMIN — ENOXAPARIN SODIUM SCH MG: 30 INJECTION SUBCUTANEOUS at 08:37

## 2023-07-02 RX ADMIN — URSODIOL SCH MG: 300 CAPSULE ORAL at 16:37

## 2023-07-02 RX ADMIN — THERA TABS SCH UDTAB: TAB at 08:32

## 2023-07-02 RX ADMIN — Medication SCH MG: at 08:26

## 2023-07-02 RX ADMIN — Medication SCH MG: at 08:33

## 2023-07-02 RX ADMIN — ALENDRONATE SODIUM SCH MG: 70 TABLET ORAL at 05:18

## 2023-07-02 RX ADMIN — PANTOPRAZOLE SODIUM SCH MG: 40 TABLET, DELAYED RELEASE ORAL at 06:17

## 2023-07-02 RX ADMIN — Medication SCH MG: at 08:25

## 2023-07-02 RX ADMIN — FLUTICASONE PROPIONATE SCH GM: 50 SPRAY, METERED NASAL at 08:34

## 2023-07-02 RX ADMIN — GABAPENTIN SCH MG: 100 CAPSULE ORAL at 08:32

## 2023-07-02 RX ADMIN — FOLIC ACID SCH MG: 1 TABLET ORAL at 08:33

## 2023-07-02 RX ADMIN — ASPIRIN SCH MG: 81 TABLET, CHEWABLE ORAL at 08:25

## 2023-07-02 RX ADMIN — Medication SCH TAB: at 08:27

## 2023-07-02 RX ADMIN — GABAPENTIN SCH MG: 100 CAPSULE ORAL at 12:03

## 2023-07-03 VITALS — OXYGEN SATURATION: 100 % | SYSTOLIC BLOOD PRESSURE: 136 MMHG | DIASTOLIC BLOOD PRESSURE: 53 MMHG | TEMPERATURE: 98.2 F

## 2023-07-03 VITALS — OXYGEN SATURATION: 93 % | DIASTOLIC BLOOD PRESSURE: 42 MMHG | TEMPERATURE: 97.9 F | SYSTOLIC BLOOD PRESSURE: 94 MMHG

## 2023-07-03 VITALS — TEMPERATURE: 98.5 F | SYSTOLIC BLOOD PRESSURE: 108 MMHG | OXYGEN SATURATION: 98 % | DIASTOLIC BLOOD PRESSURE: 58 MMHG

## 2023-07-03 VITALS — OXYGEN SATURATION: 96 % | TEMPERATURE: 97.8 F | DIASTOLIC BLOOD PRESSURE: 46 MMHG | SYSTOLIC BLOOD PRESSURE: 94 MMHG

## 2023-07-03 RX ADMIN — ASPIRIN SCH MG: 81 TABLET, CHEWABLE ORAL at 08:50

## 2023-07-03 RX ADMIN — PANTOPRAZOLE SODIUM SCH MG: 40 TABLET, DELAYED RELEASE ORAL at 06:37

## 2023-07-03 RX ADMIN — ATORVASTATIN CALCIUM SCH MG: 10 TABLET, FILM COATED ORAL at 20:38

## 2023-07-03 RX ADMIN — Medication SCH TAB: at 08:50

## 2023-07-03 RX ADMIN — Medication SCH MG: at 08:50

## 2023-07-03 RX ADMIN — FLUTICASONE FUROATE AND VILANTEROL TRIFENATATE SCH EACH: 100; 25 POWDER RESPIRATORY (INHALATION) at 08:51

## 2023-07-03 RX ADMIN — OXYCODONE HYDROCHLORIDE AND ACETAMINOPHEN SCH TAB: 5; 325 TABLET ORAL at 22:00

## 2023-07-03 RX ADMIN — AMLODIPINE BESYLATE SCH MG: 5 TABLET ORAL at 08:50

## 2023-07-03 RX ADMIN — Medication SCH MG: at 08:49

## 2023-07-03 RX ADMIN — CITALOPRAM HYDROBROMIDE SCH MG: 20 TABLET, FILM COATED ORAL at 08:50

## 2023-07-03 RX ADMIN — URSODIOL SCH MG: 300 CAPSULE ORAL at 08:51

## 2023-07-03 RX ADMIN — Medication SCH MG: at 16:33

## 2023-07-03 RX ADMIN — POLYETHYLENE GLYCOL 3350 SCH GM: 17 POWDER, FOR SOLUTION ORAL at 08:51

## 2023-07-03 RX ADMIN — GABAPENTIN SCH MG: 100 CAPSULE ORAL at 08:50

## 2023-07-03 RX ADMIN — FOLIC ACID SCH MG: 1 TABLET ORAL at 08:57

## 2023-07-03 RX ADMIN — ENOXAPARIN SODIUM SCH MG: 30 INJECTION SUBCUTANEOUS at 08:53

## 2023-07-03 RX ADMIN — OXYCODONE HYDROCHLORIDE AND ACETAMINOPHEN SCH TAB: 5; 325 TABLET ORAL at 13:30

## 2023-07-03 RX ADMIN — GABAPENTIN SCH MG: 100 CAPSULE ORAL at 12:10

## 2023-07-03 RX ADMIN — URSODIOL SCH MG: 300 CAPSULE ORAL at 16:34

## 2023-07-03 RX ADMIN — LOSARTAN POTASSIUM SCH MG: 50 TABLET, FILM COATED ORAL at 08:50

## 2023-07-03 RX ADMIN — VITAMIN D, TAB 1000IU (100/BT) SCH UNIT: 25 TAB at 08:50

## 2023-07-03 RX ADMIN — FLUTICASONE PROPIONATE SCH GM: 50 SPRAY, METERED NASAL at 08:52

## 2023-07-03 RX ADMIN — AMLODIPINE BESYLATE SCH MG: 5 TABLET ORAL at 20:38

## 2023-07-03 RX ADMIN — THERA TABS SCH UDTAB: TAB at 08:50

## 2023-07-03 RX ADMIN — GABAPENTIN SCH MG: 100 CAPSULE ORAL at 16:33

## 2023-07-04 VITALS — OXYGEN SATURATION: 97 % | DIASTOLIC BLOOD PRESSURE: 54 MMHG | TEMPERATURE: 98.3 F | SYSTOLIC BLOOD PRESSURE: 101 MMHG

## 2023-07-04 VITALS — DIASTOLIC BLOOD PRESSURE: 38 MMHG | TEMPERATURE: 98.3 F | SYSTOLIC BLOOD PRESSURE: 87 MMHG | OXYGEN SATURATION: 97 %

## 2023-07-04 VITALS — DIASTOLIC BLOOD PRESSURE: 54 MMHG | TEMPERATURE: 98.9 F | SYSTOLIC BLOOD PRESSURE: 135 MMHG | OXYGEN SATURATION: 93 %

## 2023-07-04 VITALS — OXYGEN SATURATION: 98 % | DIASTOLIC BLOOD PRESSURE: 68 MMHG | SYSTOLIC BLOOD PRESSURE: 107 MMHG | TEMPERATURE: 97.5 F

## 2023-07-04 VITALS — OXYGEN SATURATION: 98 % | SYSTOLIC BLOOD PRESSURE: 114 MMHG | DIASTOLIC BLOOD PRESSURE: 61 MMHG | TEMPERATURE: 98 F

## 2023-07-04 VITALS — TEMPERATURE: 98.5 F | SYSTOLIC BLOOD PRESSURE: 107 MMHG | OXYGEN SATURATION: 98 % | DIASTOLIC BLOOD PRESSURE: 63 MMHG

## 2023-07-04 VITALS — OXYGEN SATURATION: 96 %

## 2023-07-04 VITALS — OXYGEN SATURATION: 97 %

## 2023-07-04 RX ADMIN — Medication SCH TAB: at 08:23

## 2023-07-04 RX ADMIN — Medication SCH MG: at 08:24

## 2023-07-04 RX ADMIN — VITAMIN D, TAB 1000IU (100/BT) SCH UNIT: 25 TAB at 08:24

## 2023-07-04 RX ADMIN — Medication SCH MG: at 08:23

## 2023-07-04 RX ADMIN — OXYCODONE HYDROCHLORIDE AND ACETAMINOPHEN SCH TAB: 5; 325 TABLET ORAL at 13:46

## 2023-07-04 RX ADMIN — Medication SCH MG: at 16:39

## 2023-07-04 RX ADMIN — AMLODIPINE BESYLATE SCH MG: 5 TABLET ORAL at 21:48

## 2023-07-04 RX ADMIN — ATORVASTATIN CALCIUM SCH MG: 10 TABLET, FILM COATED ORAL at 21:48

## 2023-07-04 RX ADMIN — AMLODIPINE BESYLATE SCH MG: 5 TABLET ORAL at 08:23

## 2023-07-04 RX ADMIN — OXYCODONE HYDROCHLORIDE AND ACETAMINOPHEN SCH TAB: 5; 325 TABLET ORAL at 05:05

## 2023-07-04 RX ADMIN — PANTOPRAZOLE SODIUM SCH MG: 40 TABLET, DELAYED RELEASE ORAL at 06:03

## 2023-07-04 RX ADMIN — ENOXAPARIN SODIUM SCH MG: 30 INJECTION SUBCUTANEOUS at 08:26

## 2023-07-04 RX ADMIN — GABAPENTIN SCH MG: 100 CAPSULE ORAL at 08:24

## 2023-07-04 RX ADMIN — FLUTICASONE PROPIONATE SCH GM: 50 SPRAY, METERED NASAL at 08:24

## 2023-07-04 RX ADMIN — GABAPENTIN SCH MG: 100 CAPSULE ORAL at 16:39

## 2023-07-04 RX ADMIN — FOLIC ACID SCH MG: 1 TABLET ORAL at 08:24

## 2023-07-04 RX ADMIN — LOSARTAN POTASSIUM SCH MG: 50 TABLET, FILM COATED ORAL at 08:23

## 2023-07-04 RX ADMIN — URSODIOL SCH MG: 300 CAPSULE ORAL at 16:39

## 2023-07-04 RX ADMIN — CITALOPRAM HYDROBROMIDE SCH MG: 20 TABLET, FILM COATED ORAL at 08:23

## 2023-07-04 RX ADMIN — THERA TABS SCH UDTAB: TAB at 08:24

## 2023-07-04 RX ADMIN — ASPIRIN SCH MG: 81 TABLET, CHEWABLE ORAL at 08:23

## 2023-07-04 RX ADMIN — GABAPENTIN SCH MG: 100 CAPSULE ORAL at 13:12

## 2023-07-04 RX ADMIN — OXYCODONE HYDROCHLORIDE AND ACETAMINOPHEN SCH TAB: 5; 325 TABLET ORAL at 21:52

## 2023-07-04 RX ADMIN — POLYETHYLENE GLYCOL 3350 SCH GM: 17 POWDER, FOR SOLUTION ORAL at 08:25

## 2023-07-04 RX ADMIN — URSODIOL SCH MG: 300 CAPSULE ORAL at 08:24

## 2023-07-04 RX ADMIN — FLUTICASONE FUROATE AND VILANTEROL TRIFENATATE SCH EACH: 100; 25 POWDER RESPIRATORY (INHALATION) at 08:24

## 2023-07-05 VITALS — SYSTOLIC BLOOD PRESSURE: 107 MMHG | TEMPERATURE: 98.4 F | OXYGEN SATURATION: 99 % | DIASTOLIC BLOOD PRESSURE: 47 MMHG

## 2023-07-05 VITALS — DIASTOLIC BLOOD PRESSURE: 44 MMHG | TEMPERATURE: 98 F | SYSTOLIC BLOOD PRESSURE: 100 MMHG | OXYGEN SATURATION: 99 %

## 2023-07-05 VITALS — SYSTOLIC BLOOD PRESSURE: 103 MMHG | DIASTOLIC BLOOD PRESSURE: 52 MMHG

## 2023-07-05 VITALS — OXYGEN SATURATION: 97 %

## 2023-07-05 VITALS — TEMPERATURE: 97.8 F | OXYGEN SATURATION: 98 % | SYSTOLIC BLOOD PRESSURE: 90 MMHG | DIASTOLIC BLOOD PRESSURE: 43 MMHG

## 2023-07-05 VITALS — SYSTOLIC BLOOD PRESSURE: 107 MMHG | DIASTOLIC BLOOD PRESSURE: 47 MMHG | TEMPERATURE: 98.4 F | OXYGEN SATURATION: 99 %

## 2023-07-05 VITALS — OXYGEN SATURATION: 99 % | DIASTOLIC BLOOD PRESSURE: 47 MMHG | SYSTOLIC BLOOD PRESSURE: 107 MMHG | TEMPERATURE: 98.4 F

## 2023-07-05 VITALS — OXYGEN SATURATION: 96 %

## 2023-07-05 VITALS — TEMPERATURE: 98 F | SYSTOLIC BLOOD PRESSURE: 115 MMHG | DIASTOLIC BLOOD PRESSURE: 69 MMHG | OXYGEN SATURATION: 97 %

## 2023-07-05 RX ADMIN — ATORVASTATIN CALCIUM SCH MG: 10 TABLET, FILM COATED ORAL at 20:42

## 2023-07-05 RX ADMIN — ASPIRIN SCH MG: 81 TABLET, CHEWABLE ORAL at 08:51

## 2023-07-05 RX ADMIN — GABAPENTIN SCH MG: 100 CAPSULE ORAL at 16:18

## 2023-07-05 RX ADMIN — CITALOPRAM HYDROBROMIDE SCH MG: 20 TABLET, FILM COATED ORAL at 08:53

## 2023-07-05 RX ADMIN — OXYCODONE HYDROCHLORIDE AND ACETAMINOPHEN SCH TAB: 5; 325 TABLET ORAL at 06:16

## 2023-07-05 RX ADMIN — Medication SCH ML: at 08:59

## 2023-07-05 RX ADMIN — Medication SCH MG: at 08:51

## 2023-07-05 RX ADMIN — POLYETHYLENE GLYCOL 3350 SCH GM: 17 POWDER, FOR SOLUTION ORAL at 08:53

## 2023-07-05 RX ADMIN — GABAPENTIN SCH MG: 100 CAPSULE ORAL at 08:52

## 2023-07-05 RX ADMIN — URSODIOL SCH MG: 300 CAPSULE ORAL at 16:18

## 2023-07-05 RX ADMIN — VITAMIN D, TAB 1000IU (100/BT) SCH UNIT: 25 TAB at 08:51

## 2023-07-05 RX ADMIN — Medication SCH MG: at 08:53

## 2023-07-05 RX ADMIN — THERA TABS SCH UDTAB: TAB at 08:53

## 2023-07-05 RX ADMIN — FLUTICASONE FUROATE AND VILANTEROL TRIFENATATE SCH EACH: 100; 25 POWDER RESPIRATORY (INHALATION) at 08:50

## 2023-07-05 RX ADMIN — Medication SCH MG: at 16:18

## 2023-07-05 RX ADMIN — LOSARTAN POTASSIUM SCH MG: 50 TABLET, FILM COATED ORAL at 08:52

## 2023-07-05 RX ADMIN — URSODIOL SCH MG: 300 CAPSULE ORAL at 09:28

## 2023-07-05 RX ADMIN — FOLIC ACID SCH MG: 1 TABLET ORAL at 08:53

## 2023-07-05 RX ADMIN — AMLODIPINE BESYLATE SCH MG: 5 TABLET ORAL at 08:51

## 2023-07-05 RX ADMIN — OXYCODONE HYDROCHLORIDE AND ACETAMINOPHEN SCH TAB: 5; 325 TABLET ORAL at 14:50

## 2023-07-05 RX ADMIN — PANTOPRAZOLE SODIUM SCH MG: 40 TABLET, DELAYED RELEASE ORAL at 06:16

## 2023-07-05 RX ADMIN — GABAPENTIN SCH MG: 100 CAPSULE ORAL at 12:53

## 2023-07-05 RX ADMIN — OXYCODONE HYDROCHLORIDE AND ACETAMINOPHEN SCH TAB: 5; 325 TABLET ORAL at 22:00

## 2023-07-05 RX ADMIN — FLUTICASONE PROPIONATE SCH GM: 50 SPRAY, METERED NASAL at 08:50

## 2023-07-05 RX ADMIN — Medication SCH TAB: at 08:53

## 2023-07-06 VITALS — OXYGEN SATURATION: 96 % | SYSTOLIC BLOOD PRESSURE: 121 MMHG | DIASTOLIC BLOOD PRESSURE: 61 MMHG | TEMPERATURE: 97.8 F

## 2023-07-06 VITALS — SYSTOLIC BLOOD PRESSURE: 121 MMHG | OXYGEN SATURATION: 97 % | DIASTOLIC BLOOD PRESSURE: 64 MMHG | TEMPERATURE: 98.3 F

## 2023-07-06 VITALS — SYSTOLIC BLOOD PRESSURE: 111 MMHG | OXYGEN SATURATION: 98 % | TEMPERATURE: 97.5 F | DIASTOLIC BLOOD PRESSURE: 51 MMHG

## 2023-07-06 VITALS — OXYGEN SATURATION: 100 % | SYSTOLIC BLOOD PRESSURE: 124 MMHG | TEMPERATURE: 98.1 F | DIASTOLIC BLOOD PRESSURE: 57 MMHG

## 2023-07-06 LAB
ALP SERPL-CCNC: 328 U/L (ref 50–136)
ALT SERPL W/O P-5'-P-CCNC: 37 U/L (ref 14–59)
AST SERPL-CCNC: 32 U/L (ref 15–37)
BILIRUB SERPL-MCNC: 0.3 MG/DL (ref 0.2–1)
BUN SERPL-MCNC: 34 MG/DL (ref 7–18)
CHLORIDE SERPL-SCNC: 102 MMOL/L (ref 98–107)
CO2 SERPL-SCNC: 32 MMOL/L (ref 21–32)
CREAT SERPL-MCNC: 1.4 MG/DL (ref 0.6–1.3)
HCT VFR BLD AUTO: 22.2 % (ref 31.2–41.9)
MAGNESIUM SERPL-MCNC: 2.1 MG/DL (ref 1.8–2.4)
MCH RBC QN AUTO: 27.2 UUG (ref 24.7–32.8)
MCV RBC AUTO: 84.2 FL (ref 75.5–95.3)
PHOSPHATE SERPL-MCNC: 3.9 MG/DL (ref 2.5–4.9)
PLATELET # BLD AUTO: 313 K/UL (ref 179–408)
POTASSIUM SERPL-SCNC: 4.6 MMOL/L (ref 3.5–5.1)
WS STN SPEC: 6 G/DL (ref 6.4–8.2)

## 2023-07-06 RX ADMIN — GABAPENTIN SCH MG: 100 CAPSULE ORAL at 13:00

## 2023-07-06 RX ADMIN — FLUTICASONE FUROATE AND VILANTEROL TRIFENATATE SCH EACH: 100; 25 POWDER RESPIRATORY (INHALATION) at 09:33

## 2023-07-06 RX ADMIN — FOLIC ACID SCH MG: 1 TABLET ORAL at 09:36

## 2023-07-06 RX ADMIN — CITALOPRAM HYDROBROMIDE SCH MG: 20 TABLET, FILM COATED ORAL at 09:36

## 2023-07-06 RX ADMIN — OXYCODONE HYDROCHLORIDE AND ACETAMINOPHEN SCH TAB: 5; 325 TABLET ORAL at 14:31

## 2023-07-06 RX ADMIN — OXYCODONE HYDROCHLORIDE AND ACETAMINOPHEN SCH TAB: 5; 325 TABLET ORAL at 21:17

## 2023-07-06 RX ADMIN — THERA TABS SCH UDTAB: TAB at 09:35

## 2023-07-06 RX ADMIN — GABAPENTIN SCH MG: 100 CAPSULE ORAL at 17:32

## 2023-07-06 RX ADMIN — POLYETHYLENE GLYCOL 3350 SCH GM: 17 POWDER, FOR SOLUTION ORAL at 09:35

## 2023-07-06 RX ADMIN — GABAPENTIN SCH MG: 100 CAPSULE ORAL at 09:38

## 2023-07-06 RX ADMIN — Medication SCH MG: at 09:36

## 2023-07-06 RX ADMIN — URSODIOL SCH MG: 300 CAPSULE ORAL at 09:35

## 2023-07-06 RX ADMIN — URSODIOL SCH MG: 300 CAPSULE ORAL at 17:32

## 2023-07-06 RX ADMIN — ASPIRIN SCH MG: 81 TABLET, CHEWABLE ORAL at 09:36

## 2023-07-06 RX ADMIN — ATORVASTATIN CALCIUM SCH MG: 10 TABLET, FILM COATED ORAL at 20:55

## 2023-07-06 RX ADMIN — PANTOPRAZOLE SODIUM SCH MG: 40 TABLET, DELAYED RELEASE ORAL at 06:10

## 2023-07-06 RX ADMIN — VITAMIN D, TAB 1000IU (100/BT) SCH UNIT: 25 TAB at 09:35

## 2023-07-06 RX ADMIN — AMLODIPINE BESYLATE SCH MG: 5 TABLET ORAL at 20:55

## 2023-07-06 RX ADMIN — FLUTICASONE PROPIONATE SCH GM: 50 SPRAY, METERED NASAL at 09:34

## 2023-07-06 RX ADMIN — OXYCODONE HYDROCHLORIDE AND ACETAMINOPHEN SCH TAB: 5; 325 TABLET ORAL at 05:53

## 2023-07-06 RX ADMIN — Medication SCH MG: at 17:32

## 2023-07-06 RX ADMIN — Medication SCH ML: at 09:41

## 2023-07-06 RX ADMIN — Medication SCH TAB: at 09:35

## 2023-07-06 RX ADMIN — AMLODIPINE BESYLATE SCH MG: 5 TABLET ORAL at 09:40

## 2023-07-07 VITALS — DIASTOLIC BLOOD PRESSURE: 62 MMHG | OXYGEN SATURATION: 95 % | SYSTOLIC BLOOD PRESSURE: 126 MMHG | TEMPERATURE: 97.8 F

## 2023-07-07 VITALS — TEMPERATURE: 97.9 F | DIASTOLIC BLOOD PRESSURE: 64 MMHG | OXYGEN SATURATION: 95 % | SYSTOLIC BLOOD PRESSURE: 143 MMHG

## 2023-07-07 VITALS — TEMPERATURE: 97.9 F | SYSTOLIC BLOOD PRESSURE: 114 MMHG | OXYGEN SATURATION: 98 % | DIASTOLIC BLOOD PRESSURE: 69 MMHG

## 2023-07-07 VITALS — DIASTOLIC BLOOD PRESSURE: 76 MMHG | OXYGEN SATURATION: 94 % | SYSTOLIC BLOOD PRESSURE: 138 MMHG | TEMPERATURE: 98.5 F

## 2023-07-07 VITALS — OXYGEN SATURATION: 98 %

## 2023-07-07 LAB
ALP SERPL-CCNC: 306 U/L (ref 50–136)
ALT SERPL W/O P-5'-P-CCNC: 31 U/L (ref 14–59)
APPEARANCE UR: CLEAR
AST SERPL-CCNC: 28 U/L (ref 15–37)
BILIRUB SERPL-MCNC: 0.5 MG/DL (ref 0.2–1)
BILIRUB UR QL STRIP: NEGATIVE
BUN SERPL-MCNC: 22 MG/DL (ref 7–18)
CHLORIDE SERPL-SCNC: 104 MMOL/L (ref 98–107)
CO2 SERPL-SCNC: 31 MMOL/L (ref 21–32)
COLOR UR: YELLOW
CREAT SERPL-MCNC: 1 MG/DL (ref 0.6–1.3)
CREAT UR-MCNC: < 13 MG/DL (ref 30–125)
GLUCOSE UR STRIP-MCNC: NEGATIVE MG/DL
HCT VFR BLD AUTO: 22.1 % (ref 31.2–41.9)
HEMOCCULT STL QL: NEGATIVE
HGB UR QL STRIP: NEGATIVE
KETONES UR STRIP-MCNC: NEGATIVE MG/DL
LEUKOCYTE ESTERASE UR QL STRIP: NEGATIVE
MAGNESIUM SERPL-MCNC: 1.7 MG/DL (ref 1.8–2.4)
MCH RBC QN AUTO: 27.2 UUG (ref 24.7–32.8)
MCV RBC AUTO: 84.5 FL (ref 75.5–95.3)
NITRITE UR QL STRIP: NEGATIVE
PH UR STRIP: 7.5 [PH] (ref 5–8)
PHOSPHATE SERPL-MCNC: 3.1 MG/DL (ref 2.5–4.9)
PLATELET # BLD AUTO: 283 K/UL (ref 179–408)
POTASSIUM SERPL-SCNC: 3.7 MMOL/L (ref 3.5–5.1)
PROT UR-MCNC: < 6 MG/DL
SP GR UR STRIP: 1.01 (ref 1–1.03)
UROBILINOGEN UR STRIP-MCNC: 0.2 E.U./DL
WS STN SPEC: 5.7 G/DL (ref 6.4–8.2)

## 2023-07-07 RX ADMIN — FOLIC ACID SCH MG: 1 TABLET ORAL at 08:01

## 2023-07-07 RX ADMIN — URSODIOL SCH MG: 300 CAPSULE ORAL at 08:00

## 2023-07-07 RX ADMIN — GABAPENTIN SCH MG: 100 CAPSULE ORAL at 12:37

## 2023-07-07 RX ADMIN — ATORVASTATIN CALCIUM SCH MG: 10 TABLET, FILM COATED ORAL at 20:48

## 2023-07-07 RX ADMIN — VITAMIN D, TAB 1000IU (100/BT) SCH UNIT: 25 TAB at 08:01

## 2023-07-07 RX ADMIN — OXYCODONE HYDROCHLORIDE AND ACETAMINOPHEN SCH TAB: 5; 325 TABLET ORAL at 22:02

## 2023-07-07 RX ADMIN — THERA TABS SCH UDTAB: TAB at 08:00

## 2023-07-07 RX ADMIN — URSODIOL SCH MG: 300 CAPSULE ORAL at 17:09

## 2023-07-07 RX ADMIN — Medication SCH TAB: at 08:02

## 2023-07-07 RX ADMIN — FLUTICASONE FUROATE AND VILANTEROL TRIFENATATE SCH EACH: 100; 25 POWDER RESPIRATORY (INHALATION) at 08:03

## 2023-07-07 RX ADMIN — FLUTICASONE PROPIONATE SCH GM: 50 SPRAY, METERED NASAL at 08:04

## 2023-07-07 RX ADMIN — CITALOPRAM HYDROBROMIDE SCH MG: 20 TABLET, FILM COATED ORAL at 08:01

## 2023-07-07 RX ADMIN — GABAPENTIN SCH MG: 100 CAPSULE ORAL at 17:09

## 2023-07-07 RX ADMIN — ASPIRIN SCH MG: 81 TABLET, CHEWABLE ORAL at 08:02

## 2023-07-07 RX ADMIN — POLYETHYLENE GLYCOL 3350 SCH GM: 17 POWDER, FOR SOLUTION ORAL at 08:05

## 2023-07-07 RX ADMIN — PANTOPRAZOLE SODIUM SCH MG: 40 TABLET, DELAYED RELEASE ORAL at 06:11

## 2023-07-07 RX ADMIN — GABAPENTIN SCH MG: 100 CAPSULE ORAL at 08:01

## 2023-07-07 RX ADMIN — OXYCODONE HYDROCHLORIDE AND ACETAMINOPHEN SCH TAB: 5; 325 TABLET ORAL at 13:57

## 2023-07-07 RX ADMIN — Medication SCH MG: at 08:02

## 2023-07-07 RX ADMIN — Medication SCH MG: at 17:09

## 2023-07-07 RX ADMIN — AMLODIPINE BESYLATE SCH MG: 5 TABLET ORAL at 08:00

## 2023-07-07 RX ADMIN — Medication SCH MG: at 08:01

## 2023-07-07 RX ADMIN — Medication SCH ML: at 08:04

## 2023-07-07 RX ADMIN — AMLODIPINE BESYLATE SCH MG: 5 TABLET ORAL at 20:48

## 2023-07-07 RX ADMIN — ALENDRONATE SODIUM SCH MG: 70 TABLET ORAL at 06:12

## 2023-07-07 RX ADMIN — Medication SCH MG: at 08:00

## 2023-07-07 RX ADMIN — OXYCODONE HYDROCHLORIDE AND ACETAMINOPHEN SCH TAB: 5; 325 TABLET ORAL at 06:11

## 2023-07-08 VITALS — OXYGEN SATURATION: 98 % | TEMPERATURE: 98.5 F | SYSTOLIC BLOOD PRESSURE: 134 MMHG | DIASTOLIC BLOOD PRESSURE: 73 MMHG

## 2023-07-08 VITALS — DIASTOLIC BLOOD PRESSURE: 70 MMHG | OXYGEN SATURATION: 99 % | SYSTOLIC BLOOD PRESSURE: 140 MMHG | TEMPERATURE: 98.7 F

## 2023-07-08 VITALS — SYSTOLIC BLOOD PRESSURE: 133 MMHG | TEMPERATURE: 98.2 F | OXYGEN SATURATION: 100 % | DIASTOLIC BLOOD PRESSURE: 56 MMHG

## 2023-07-08 VITALS — OXYGEN SATURATION: 97 %

## 2023-07-08 VITALS — TEMPERATURE: 98 F | DIASTOLIC BLOOD PRESSURE: 62 MMHG | SYSTOLIC BLOOD PRESSURE: 123 MMHG | OXYGEN SATURATION: 98 %

## 2023-07-08 LAB
BUN SERPL-MCNC: 18 MG/DL (ref 7–18)
CHLORIDE SERPL-SCNC: 101 MMOL/L (ref 98–107)
CO2 SERPL-SCNC: 35 MMOL/L (ref 21–32)
CREAT SERPL-MCNC: 1 MG/DL (ref 0.6–1.3)
HCT VFR BLD AUTO: 26.4 % (ref 31.2–41.9)
MAGNESIUM SERPL-MCNC: 2.1 MG/DL (ref 1.8–2.4)
MCH RBC QN AUTO: 27.1 UUG (ref 24.7–32.8)
MCV RBC AUTO: 84.4 FL (ref 75.5–95.3)
PHOSPHATE SERPL-MCNC: 3.1 MG/DL (ref 2.5–4.9)
PLATELET # BLD AUTO: 357 K/UL (ref 179–408)
POTASSIUM SERPL-SCNC: 3.5 MMOL/L (ref 3.5–5.1)

## 2023-07-08 RX ADMIN — Medication SCH MG: at 08:47

## 2023-07-08 RX ADMIN — FOLIC ACID SCH MG: 1 TABLET ORAL at 08:47

## 2023-07-08 RX ADMIN — OXYCODONE HYDROCHLORIDE AND ACETAMINOPHEN SCH TAB: 5; 325 TABLET ORAL at 13:13

## 2023-07-08 RX ADMIN — Medication SCH MG: at 17:15

## 2023-07-08 RX ADMIN — THERA TABS SCH UDTAB: TAB at 08:46

## 2023-07-08 RX ADMIN — POLYETHYLENE GLYCOL 3350 SCH GM: 17 POWDER, FOR SOLUTION ORAL at 08:40

## 2023-07-08 RX ADMIN — FLUTICASONE PROPIONATE SCH GM: 50 SPRAY, METERED NASAL at 08:40

## 2023-07-08 RX ADMIN — PANTOPRAZOLE SODIUM SCH MG: 40 TABLET, DELAYED RELEASE ORAL at 05:58

## 2023-07-08 RX ADMIN — URSODIOL SCH MG: 300 CAPSULE ORAL at 08:40

## 2023-07-08 RX ADMIN — FLUTICASONE FUROATE AND VILANTEROL TRIFENATATE SCH EACH: 100; 25 POWDER RESPIRATORY (INHALATION) at 08:40

## 2023-07-08 RX ADMIN — GABAPENTIN SCH MG: 100 CAPSULE ORAL at 08:49

## 2023-07-08 RX ADMIN — Medication SCH MG: at 08:46

## 2023-07-08 RX ADMIN — CITALOPRAM HYDROBROMIDE SCH MG: 20 TABLET, FILM COATED ORAL at 08:46

## 2023-07-08 RX ADMIN — AMLODIPINE BESYLATE SCH MG: 5 TABLET ORAL at 08:47

## 2023-07-08 RX ADMIN — OXYCODONE HYDROCHLORIDE AND ACETAMINOPHEN SCH TAB: 5; 325 TABLET ORAL at 05:35

## 2023-07-08 RX ADMIN — Medication SCH TAB: at 08:46

## 2023-07-08 RX ADMIN — GABAPENTIN SCH MG: 100 CAPSULE ORAL at 17:15

## 2023-07-08 RX ADMIN — VITAMIN D, TAB 1000IU (100/BT) SCH UNIT: 25 TAB at 08:47

## 2023-07-08 RX ADMIN — Medication SCH ML: at 08:48

## 2023-07-08 RX ADMIN — URSODIOL SCH MG: 300 CAPSULE ORAL at 17:15

## 2023-07-08 RX ADMIN — GABAPENTIN SCH MG: 100 CAPSULE ORAL at 13:13

## 2023-07-08 RX ADMIN — ASPIRIN SCH MG: 81 TABLET, CHEWABLE ORAL at 08:47

## 2023-07-09 VITALS — OXYGEN SATURATION: 98 %

## 2023-07-09 VITALS — TEMPERATURE: 98.3 F | OXYGEN SATURATION: 100 % | DIASTOLIC BLOOD PRESSURE: 71 MMHG | SYSTOLIC BLOOD PRESSURE: 131 MMHG

## 2023-07-09 VITALS — TEMPERATURE: 98.3 F | OXYGEN SATURATION: 100 % | SYSTOLIC BLOOD PRESSURE: 101 MMHG | DIASTOLIC BLOOD PRESSURE: 65 MMHG

## 2023-07-09 VITALS — SYSTOLIC BLOOD PRESSURE: 118 MMHG | OXYGEN SATURATION: 95 % | TEMPERATURE: 98.1 F | DIASTOLIC BLOOD PRESSURE: 58 MMHG

## 2023-07-09 VITALS — OXYGEN SATURATION: 96 % | TEMPERATURE: 98.2 F | DIASTOLIC BLOOD PRESSURE: 57 MMHG | SYSTOLIC BLOOD PRESSURE: 106 MMHG

## 2023-07-09 VITALS — OXYGEN SATURATION: 97 %

## 2023-07-09 RX ADMIN — THERA TABS SCH UDTAB: TAB at 08:57

## 2023-07-09 RX ADMIN — URSODIOL SCH MG: 300 CAPSULE ORAL at 16:05

## 2023-07-09 RX ADMIN — GABAPENTIN SCH MG: 100 CAPSULE ORAL at 16:05

## 2023-07-09 RX ADMIN — OXYCODONE HYDROCHLORIDE AND ACETAMINOPHEN SCH TAB: 5; 325 TABLET ORAL at 06:54

## 2023-07-09 RX ADMIN — POLYETHYLENE GLYCOL 3350 SCH GM: 17 POWDER, FOR SOLUTION ORAL at 09:02

## 2023-07-09 RX ADMIN — GABAPENTIN SCH MG: 100 CAPSULE ORAL at 13:07

## 2023-07-09 RX ADMIN — ASPIRIN SCH MG: 81 TABLET, CHEWABLE ORAL at 08:57

## 2023-07-09 RX ADMIN — PANTOPRAZOLE SODIUM SCH MG: 40 TABLET, DELAYED RELEASE ORAL at 06:54

## 2023-07-09 RX ADMIN — FOLIC ACID SCH MG: 1 TABLET ORAL at 08:58

## 2023-07-09 RX ADMIN — Medication SCH ML: at 09:02

## 2023-07-09 RX ADMIN — OXYCODONE HYDROCHLORIDE AND ACETAMINOPHEN SCH TAB: 5; 325 TABLET ORAL at 13:08

## 2023-07-09 RX ADMIN — URSODIOL SCH MG: 300 CAPSULE ORAL at 09:03

## 2023-07-09 RX ADMIN — Medication SCH MG: at 08:58

## 2023-07-09 RX ADMIN — Medication SCH MG: at 16:05

## 2023-07-09 RX ADMIN — ATORVASTATIN CALCIUM SCH MG: 10 TABLET, FILM COATED ORAL at 00:25

## 2023-07-09 RX ADMIN — AMLODIPINE BESYLATE SCH MG: 5 TABLET ORAL at 09:02

## 2023-07-09 RX ADMIN — ATORVASTATIN CALCIUM SCH MG: 10 TABLET, FILM COATED ORAL at 21:53

## 2023-07-09 RX ADMIN — OXYCODONE HYDROCHLORIDE AND ACETAMINOPHEN SCH TAB: 5; 325 TABLET ORAL at 00:25

## 2023-07-09 RX ADMIN — VITAMIN D, TAB 1000IU (100/BT) SCH UNIT: 25 TAB at 08:58

## 2023-07-09 RX ADMIN — OXYCODONE HYDROCHLORIDE AND ACETAMINOPHEN SCH TAB: 5; 325 TABLET ORAL at 21:53

## 2023-07-09 RX ADMIN — Medication SCH MG: at 08:57

## 2023-07-09 RX ADMIN — FLUTICASONE PROPIONATE SCH GM: 50 SPRAY, METERED NASAL at 09:03

## 2023-07-09 RX ADMIN — ALENDRONATE SODIUM SCH MG: 70 TABLET ORAL at 06:55

## 2023-07-09 RX ADMIN — FLUTICASONE FUROATE AND VILANTEROL TRIFENATATE SCH EACH: 100; 25 POWDER RESPIRATORY (INHALATION) at 09:03

## 2023-07-09 RX ADMIN — GABAPENTIN SCH MG: 100 CAPSULE ORAL at 08:57

## 2023-07-09 RX ADMIN — Medication SCH TAB: at 08:57

## 2023-07-09 RX ADMIN — AMLODIPINE BESYLATE SCH MG: 5 TABLET ORAL at 21:00

## 2023-07-09 RX ADMIN — AMLODIPINE BESYLATE SCH MG: 5 TABLET ORAL at 00:25

## 2023-07-09 RX ADMIN — CITALOPRAM HYDROBROMIDE SCH MG: 20 TABLET, FILM COATED ORAL at 08:57

## 2023-07-10 VITALS — DIASTOLIC BLOOD PRESSURE: 86 MMHG | OXYGEN SATURATION: 92 % | SYSTOLIC BLOOD PRESSURE: 133 MMHG | TEMPERATURE: 98 F

## 2023-07-10 VITALS — DIASTOLIC BLOOD PRESSURE: 62 MMHG | OXYGEN SATURATION: 99 % | TEMPERATURE: 98.4 F | SYSTOLIC BLOOD PRESSURE: 113 MMHG

## 2023-07-10 VITALS — DIASTOLIC BLOOD PRESSURE: 63 MMHG | OXYGEN SATURATION: 99 % | SYSTOLIC BLOOD PRESSURE: 128 MMHG | TEMPERATURE: 97.9 F

## 2023-07-10 VITALS — TEMPERATURE: 98.3 F | SYSTOLIC BLOOD PRESSURE: 136 MMHG | DIASTOLIC BLOOD PRESSURE: 82 MMHG | OXYGEN SATURATION: 91 %

## 2023-07-10 VITALS — OXYGEN SATURATION: 98 %

## 2023-07-10 RX ADMIN — OXYCODONE HYDROCHLORIDE AND ACETAMINOPHEN SCH TAB: 5; 325 TABLET ORAL at 06:28

## 2023-07-10 RX ADMIN — AMLODIPINE BESYLATE SCH MG: 5 TABLET ORAL at 09:17

## 2023-07-10 RX ADMIN — Medication SCH MG: at 16:43

## 2023-07-10 RX ADMIN — OXYCODONE HYDROCHLORIDE AND ACETAMINOPHEN SCH TAB: 5; 325 TABLET ORAL at 21:56

## 2023-07-10 RX ADMIN — Medication SCH MG: at 09:39

## 2023-07-10 RX ADMIN — THERA TABS SCH UDTAB: TAB at 09:09

## 2023-07-10 RX ADMIN — Medication SCH MG: at 09:10

## 2023-07-10 RX ADMIN — FLUTICASONE FUROATE AND VILANTEROL TRIFENATATE SCH EACH: 100; 25 POWDER RESPIRATORY (INHALATION) at 09:08

## 2023-07-10 RX ADMIN — ASPIRIN SCH MG: 81 TABLET, CHEWABLE ORAL at 09:09

## 2023-07-10 RX ADMIN — POLYETHYLENE GLYCOL 3350 SCH GM: 17 POWDER, FOR SOLUTION ORAL at 09:08

## 2023-07-10 RX ADMIN — ATORVASTATIN CALCIUM SCH MG: 10 TABLET, FILM COATED ORAL at 20:00

## 2023-07-10 RX ADMIN — AMLODIPINE BESYLATE SCH MG: 5 TABLET ORAL at 20:00

## 2023-07-10 RX ADMIN — FOLIC ACID SCH MG: 1 TABLET ORAL at 09:10

## 2023-07-10 RX ADMIN — Medication SCH TAB: at 09:17

## 2023-07-10 RX ADMIN — URSODIOL SCH MG: 300 CAPSULE ORAL at 09:09

## 2023-07-10 RX ADMIN — CITALOPRAM HYDROBROMIDE SCH MG: 20 TABLET, FILM COATED ORAL at 09:17

## 2023-07-10 RX ADMIN — GABAPENTIN SCH MG: 100 CAPSULE ORAL at 16:43

## 2023-07-10 RX ADMIN — GABAPENTIN SCH MG: 100 CAPSULE ORAL at 09:09

## 2023-07-10 RX ADMIN — OXYCODONE HYDROCHLORIDE AND ACETAMINOPHEN SCH TAB: 5; 325 TABLET ORAL at 14:20

## 2023-07-10 RX ADMIN — Medication SCH ML: at 09:20

## 2023-07-10 RX ADMIN — URSODIOL SCH MG: 300 CAPSULE ORAL at 16:43

## 2023-07-10 RX ADMIN — FLUTICASONE PROPIONATE SCH GM: 50 SPRAY, METERED NASAL at 09:08

## 2023-07-10 RX ADMIN — PANTOPRAZOLE SODIUM SCH MG: 40 TABLET, DELAYED RELEASE ORAL at 06:28

## 2023-07-10 RX ADMIN — VITAMIN D, TAB 1000IU (100/BT) SCH UNIT: 25 TAB at 09:09

## 2023-07-10 RX ADMIN — GABAPENTIN SCH MG: 100 CAPSULE ORAL at 14:21

## 2023-07-11 VITALS — TEMPERATURE: 97.6 F | SYSTOLIC BLOOD PRESSURE: 135 MMHG | DIASTOLIC BLOOD PRESSURE: 67 MMHG | OXYGEN SATURATION: 99 %

## 2023-07-11 VITALS — TEMPERATURE: 98.3 F | DIASTOLIC BLOOD PRESSURE: 66 MMHG | SYSTOLIC BLOOD PRESSURE: 118 MMHG | OXYGEN SATURATION: 95 %

## 2023-07-11 VITALS — OXYGEN SATURATION: 98 % | SYSTOLIC BLOOD PRESSURE: 151 MMHG | DIASTOLIC BLOOD PRESSURE: 81 MMHG | TEMPERATURE: 98.8 F

## 2023-07-11 VITALS — SYSTOLIC BLOOD PRESSURE: 142 MMHG | DIASTOLIC BLOOD PRESSURE: 68 MMHG | OXYGEN SATURATION: 93 % | TEMPERATURE: 98.7 F

## 2023-07-11 VITALS — OXYGEN SATURATION: 98 %

## 2023-07-11 VITALS — TEMPERATURE: 98.5 F | DIASTOLIC BLOOD PRESSURE: 67 MMHG | OXYGEN SATURATION: 92 % | SYSTOLIC BLOOD PRESSURE: 142 MMHG

## 2023-07-11 LAB
BUN SERPL-MCNC: 26 MG/DL (ref 7–18)
CHLORIDE SERPL-SCNC: 103 MMOL/L (ref 98–107)
CO2 SERPL-SCNC: 32 MMOL/L (ref 21–32)
CREAT SERPL-MCNC: 1.1 MG/DL (ref 0.6–1.3)
HCT VFR BLD AUTO: 23.9 % (ref 31.2–41.9)
MAGNESIUM SERPL-MCNC: 1.9 MG/DL (ref 1.8–2.4)
MCH RBC QN AUTO: 27 UUG (ref 24.7–32.8)
MCV RBC AUTO: 83.5 FL (ref 75.5–95.3)
PHOSPHATE SERPL-MCNC: 2.9 MG/DL (ref 2.5–4.9)
PLATELET # BLD AUTO: 290 K/UL (ref 179–408)
POTASSIUM SERPL-SCNC: 3.6 MMOL/L (ref 3.5–5.1)

## 2023-07-11 RX ADMIN — Medication SCH MG: at 09:25

## 2023-07-11 RX ADMIN — FLUTICASONE FUROATE AND VILANTEROL TRIFENATATE SCH EACH: 100; 25 POWDER RESPIRATORY (INHALATION) at 09:19

## 2023-07-11 RX ADMIN — CITALOPRAM HYDROBROMIDE SCH MG: 20 TABLET, FILM COATED ORAL at 09:25

## 2023-07-11 RX ADMIN — PANTOPRAZOLE SODIUM SCH MG: 40 TABLET, DELAYED RELEASE ORAL at 06:02

## 2023-07-11 RX ADMIN — OXYCODONE HYDROCHLORIDE AND ACETAMINOPHEN SCH TAB: 5; 325 TABLET ORAL at 05:54

## 2023-07-11 RX ADMIN — GABAPENTIN SCH MG: 100 CAPSULE ORAL at 09:24

## 2023-07-11 RX ADMIN — POLYETHYLENE GLYCOL 3350 SCH GM: 17 POWDER, FOR SOLUTION ORAL at 09:00

## 2023-07-11 RX ADMIN — GABAPENTIN SCH MG: 100 CAPSULE ORAL at 13:26

## 2023-07-11 RX ADMIN — ASPIRIN SCH MG: 81 TABLET, CHEWABLE ORAL at 09:24

## 2023-07-11 RX ADMIN — AMLODIPINE BESYLATE SCH MG: 5 TABLET ORAL at 09:25

## 2023-07-11 RX ADMIN — VITAMIN D, TAB 1000IU (100/BT) SCH UNIT: 25 TAB at 09:24

## 2023-07-11 RX ADMIN — FLUTICASONE PROPIONATE SCH SPR: 50 SPRAY, METERED NASAL at 09:21

## 2023-07-11 RX ADMIN — Medication SCH ML: at 09:23

## 2023-07-11 RX ADMIN — URSODIOL SCH MG: 300 CAPSULE ORAL at 16:48

## 2023-07-11 RX ADMIN — Medication SCH TAB: at 09:24

## 2023-07-11 RX ADMIN — OXYCODONE HYDROCHLORIDE AND ACETAMINOPHEN SCH TAB: 5; 325 TABLET ORAL at 21:07

## 2023-07-11 RX ADMIN — URSODIOL SCH MG: 300 CAPSULE ORAL at 09:23

## 2023-07-11 RX ADMIN — Medication SCH MG: at 16:48

## 2023-07-11 RX ADMIN — AMLODIPINE BESYLATE SCH MG: 5 TABLET ORAL at 21:07

## 2023-07-11 RX ADMIN — THERA TABS SCH UDTAB: TAB at 09:24

## 2023-07-11 RX ADMIN — FOLIC ACID SCH MG: 1 TABLET ORAL at 09:25

## 2023-07-11 RX ADMIN — ATORVASTATIN CALCIUM SCH MG: 10 TABLET, FILM COATED ORAL at 21:06

## 2023-07-11 RX ADMIN — OXYCODONE HYDROCHLORIDE AND ACETAMINOPHEN SCH TAB: 5; 325 TABLET ORAL at 13:29

## 2023-07-11 RX ADMIN — GABAPENTIN SCH MG: 100 CAPSULE ORAL at 16:48

## 2023-07-12 VITALS — SYSTOLIC BLOOD PRESSURE: 128 MMHG | OXYGEN SATURATION: 90 % | TEMPERATURE: 98 F

## 2023-07-12 VITALS — DIASTOLIC BLOOD PRESSURE: 69 MMHG | OXYGEN SATURATION: 96 % | SYSTOLIC BLOOD PRESSURE: 141 MMHG | TEMPERATURE: 98.4 F

## 2023-07-12 VITALS — SYSTOLIC BLOOD PRESSURE: 138 MMHG | OXYGEN SATURATION: 98 % | DIASTOLIC BLOOD PRESSURE: 65 MMHG | TEMPERATURE: 97.6 F

## 2023-07-12 VITALS — OXYGEN SATURATION: 98 % | TEMPERATURE: 98 F | SYSTOLIC BLOOD PRESSURE: 120 MMHG | DIASTOLIC BLOOD PRESSURE: 59 MMHG

## 2023-07-12 VITALS — DIASTOLIC BLOOD PRESSURE: 66 MMHG | SYSTOLIC BLOOD PRESSURE: 157 MMHG | TEMPERATURE: 98.1 F

## 2023-07-12 VITALS — SYSTOLIC BLOOD PRESSURE: 128 MMHG | TEMPERATURE: 97.8 F | DIASTOLIC BLOOD PRESSURE: 66 MMHG

## 2023-07-12 VITALS — DIASTOLIC BLOOD PRESSURE: 68 MMHG | SYSTOLIC BLOOD PRESSURE: 135 MMHG | TEMPERATURE: 98.2 F

## 2023-07-12 VITALS — OXYGEN SATURATION: 98 %

## 2023-07-12 LAB
ALP SERPL-CCNC: 255 U/L (ref 50–136)
ALT SERPL W/O P-5'-P-CCNC: 28 U/L (ref 14–59)
AST SERPL-CCNC: 28 U/L (ref 15–37)
BILIRUB DIRECT SERPL-MCNC: 0.1 MG/DL (ref 0–0.2)
BILIRUB SERPL-MCNC: 0.3 MG/DL (ref 0.2–1)
BUN SERPL-MCNC: 30 MG/DL (ref 7–18)
CHLORIDE SERPL-SCNC: 106 MMOL/L (ref 98–107)
CO2 SERPL-SCNC: 32 MMOL/L (ref 21–32)
CREAT SERPL-MCNC: 1 MG/DL (ref 0.6–1.3)
EOSINOPHIL NFR BLD MANUAL: 2 % (ref 0–8)
FERRITIN SERPL-MCNC: 36 NG/ML (ref 8–252)
HCT VFR BLD AUTO: 20.2 % (ref 31.2–41.9)
IRON SERPL-MCNC: 11 UG/DL (ref 50–175)
LYMPHOCYTES NFR BLD MANUAL: 27 % (ref 20–40)
MAGNESIUM SERPL-MCNC: 2.1 MG/DL (ref 1.8–2.4)
MCH RBC QN AUTO: 26.9 UUG (ref 24.7–32.8)
MCV RBC AUTO: 83.7 FL (ref 75.5–95.3)
MONOCYTES NFR BLD MANUAL: 16 % (ref 2–10)
NEUTS SEG NFR BLD MANUAL: 55 % (ref 42–75)
PHOSPHATE SERPL-MCNC: 3.7 MG/DL (ref 2.5–4.9)
PLATELET # BLD AUTO: 255 K/UL (ref 179–408)
POTASSIUM SERPL-SCNC: 3.9 MMOL/L (ref 3.5–5.1)
WS STN SPEC: 5.6 G/DL (ref 6.4–8.2)

## 2023-07-12 PROCEDURE — 30233N1 TRANSFUSION OF NONAUTOLOGOUS RED BLOOD CELLS INTO PERIPHERAL VEIN, PERCUTANEOUS APPROACH: ICD-10-PCS

## 2023-07-12 RX ADMIN — FLUTICASONE FUROATE AND VILANTEROL TRIFENATATE SCH EACH: 100; 25 POWDER RESPIRATORY (INHALATION) at 08:55

## 2023-07-12 RX ADMIN — Medication SCH MG: at 08:56

## 2023-07-12 RX ADMIN — Medication SCH ML: at 08:57

## 2023-07-12 RX ADMIN — GABAPENTIN SCH MG: 100 CAPSULE ORAL at 08:56

## 2023-07-12 RX ADMIN — ASPIRIN SCH MG: 81 TABLET, CHEWABLE ORAL at 08:56

## 2023-07-12 RX ADMIN — OXYCODONE HYDROCHLORIDE AND ACETAMINOPHEN SCH TAB: 5; 325 TABLET ORAL at 21:12

## 2023-07-12 RX ADMIN — FOLIC ACID SCH MG: 1 TABLET ORAL at 08:56

## 2023-07-12 RX ADMIN — VITAMIN D, TAB 1000IU (100/BT) SCH UNIT: 25 TAB at 08:56

## 2023-07-12 RX ADMIN — OXYCODONE HYDROCHLORIDE AND ACETAMINOPHEN SCH TAB: 5; 325 TABLET ORAL at 14:12

## 2023-07-12 RX ADMIN — Medication SCH MG: at 08:55

## 2023-07-12 RX ADMIN — Medication SCH MG: at 17:52

## 2023-07-12 RX ADMIN — ATORVASTATIN CALCIUM SCH MG: 10 TABLET, FILM COATED ORAL at 20:36

## 2023-07-12 RX ADMIN — Medication SCH TAB: at 08:56

## 2023-07-12 RX ADMIN — GABAPENTIN SCH MG: 100 CAPSULE ORAL at 17:52

## 2023-07-12 RX ADMIN — GABAPENTIN SCH MG: 100 CAPSULE ORAL at 14:11

## 2023-07-12 RX ADMIN — AMLODIPINE BESYLATE SCH MG: 5 TABLET ORAL at 08:56

## 2023-07-12 RX ADMIN — URSODIOL SCH MG: 300 CAPSULE ORAL at 08:55

## 2023-07-12 RX ADMIN — AMLODIPINE BESYLATE SCH MG: 5 TABLET ORAL at 20:36

## 2023-07-12 RX ADMIN — POLYETHYLENE GLYCOL 3350 SCH GM: 17 POWDER, FOR SOLUTION ORAL at 08:55

## 2023-07-12 RX ADMIN — PANTOPRAZOLE SODIUM SCH MG: 40 TABLET, DELAYED RELEASE ORAL at 06:16

## 2023-07-12 RX ADMIN — FLUTICASONE PROPIONATE SCH SPR: 50 SPRAY, METERED NASAL at 08:55

## 2023-07-12 RX ADMIN — CITALOPRAM HYDROBROMIDE SCH MG: 20 TABLET, FILM COATED ORAL at 08:56

## 2023-07-12 RX ADMIN — OXYCODONE HYDROCHLORIDE AND ACETAMINOPHEN SCH TAB: 5; 325 TABLET ORAL at 05:32

## 2023-07-12 RX ADMIN — URSODIOL SCH MG: 300 CAPSULE ORAL at 17:52

## 2023-07-12 RX ADMIN — THERA TABS SCH UDTAB: TAB at 08:55

## 2023-07-13 VITALS — OXYGEN SATURATION: 98 %

## 2023-07-13 VITALS — OXYGEN SATURATION: 98 % | DIASTOLIC BLOOD PRESSURE: 65 MMHG | SYSTOLIC BLOOD PRESSURE: 135 MMHG | TEMPERATURE: 98.4 F

## 2023-07-13 VITALS — DIASTOLIC BLOOD PRESSURE: 58 MMHG | SYSTOLIC BLOOD PRESSURE: 115 MMHG

## 2023-07-13 VITALS — SYSTOLIC BLOOD PRESSURE: 115 MMHG | DIASTOLIC BLOOD PRESSURE: 58 MMHG | TEMPERATURE: 98.4 F | OXYGEN SATURATION: 98 %

## 2023-07-13 LAB
BUN SERPL-MCNC: 28 MG/DL (ref 7–18)
CHLORIDE SERPL-SCNC: 104 MMOL/L (ref 98–107)
CO2 SERPL-SCNC: 31 MMOL/L (ref 21–32)
CREAT SERPL-MCNC: 1.1 MG/DL (ref 0.6–1.3)
HCT VFR BLD AUTO: 24.2 % (ref 31.2–41.9)
MAGNESIUM SERPL-MCNC: 1.7 MG/DL (ref 1.8–2.4)
MCH RBC QN AUTO: 27.5 UUG (ref 24.7–32.8)
MCV RBC AUTO: 85.4 FL (ref 75.5–95.3)
PHOSPHATE SERPL-MCNC: 3.8 MG/DL (ref 2.5–4.9)
PLATELET # BLD AUTO: 259 K/UL (ref 179–408)
POTASSIUM SERPL-SCNC: 4.1 MMOL/L (ref 3.5–5.1)

## 2023-07-13 RX ADMIN — AMLODIPINE BESYLATE SCH MG: 5 TABLET ORAL at 08:28

## 2023-07-13 RX ADMIN — OXYCODONE HYDROCHLORIDE AND ACETAMINOPHEN SCH TAB: 5; 325 TABLET ORAL at 05:35

## 2023-07-13 RX ADMIN — FLUTICASONE PROPIONATE SCH SPR: 50 SPRAY, METERED NASAL at 08:31

## 2023-07-13 RX ADMIN — Medication SCH MG: at 08:27

## 2023-07-13 RX ADMIN — GABAPENTIN SCH MG: 100 CAPSULE ORAL at 08:27

## 2023-07-13 RX ADMIN — OXYCODONE HYDROCHLORIDE AND ACETAMINOPHEN SCH TAB: 5; 325 TABLET ORAL at 13:23

## 2023-07-13 RX ADMIN — POLYETHYLENE GLYCOL 3350 SCH GM: 17 POWDER, FOR SOLUTION ORAL at 08:29

## 2023-07-13 RX ADMIN — CITALOPRAM HYDROBROMIDE SCH MG: 20 TABLET, FILM COATED ORAL at 08:28

## 2023-07-13 RX ADMIN — THERA TABS SCH UDTAB: TAB at 08:28

## 2023-07-13 RX ADMIN — Medication SCH MG: at 08:29

## 2023-07-13 RX ADMIN — FOLIC ACID SCH MG: 1 TABLET ORAL at 08:27

## 2023-07-13 RX ADMIN — VITAMIN D, TAB 1000IU (100/BT) SCH UNIT: 25 TAB at 08:27

## 2023-07-13 RX ADMIN — GABAPENTIN SCH MG: 100 CAPSULE ORAL at 12:22

## 2023-07-13 RX ADMIN — PANTOPRAZOLE SODIUM SCH MG: 40 TABLET, DELAYED RELEASE ORAL at 06:04

## 2023-07-13 RX ADMIN — URSODIOL SCH MG: 300 CAPSULE ORAL at 08:30

## 2023-07-13 RX ADMIN — Medication SCH TAB: at 08:27

## 2023-07-13 RX ADMIN — ASPIRIN SCH MG: 81 TABLET, CHEWABLE ORAL at 08:28

## 2023-07-13 RX ADMIN — FLUTICASONE FUROATE AND VILANTEROL TRIFENATATE SCH EACH: 100; 25 POWDER RESPIRATORY (INHALATION) at 08:31

## 2023-10-05 ENCOUNTER — HOSPITAL ENCOUNTER (INPATIENT)
Dept: HOSPITAL 12 - ER | Age: 78
LOS: 3 days | Discharge: NURSING, REHAB OR LONG-TERM CARE | DRG: 191 | End: 2023-10-08
Payer: MEDICARE

## 2023-10-05 VITALS — OXYGEN SATURATION: 97 % | SYSTOLIC BLOOD PRESSURE: 132 MMHG | TEMPERATURE: 96.7 F | DIASTOLIC BLOOD PRESSURE: 63 MMHG

## 2023-10-05 VITALS — OXYGEN SATURATION: 94 %

## 2023-10-05 VITALS — OXYGEN SATURATION: 98 %

## 2023-10-05 VITALS — DIASTOLIC BLOOD PRESSURE: 80 MMHG | TEMPERATURE: 99.2 F | SYSTOLIC BLOOD PRESSURE: 140 MMHG | OXYGEN SATURATION: 94 %

## 2023-10-05 VITALS — SYSTOLIC BLOOD PRESSURE: 144 MMHG | TEMPERATURE: 98.6 F | OXYGEN SATURATION: 97 % | DIASTOLIC BLOOD PRESSURE: 66 MMHG

## 2023-10-05 VITALS — WEIGHT: 121.25 LBS | BODY MASS INDEX: 24.44 KG/M2 | HEIGHT: 59 IN

## 2023-10-05 VITALS — OXYGEN SATURATION: 96 %

## 2023-10-05 VITALS — OXYGEN SATURATION: 92 %

## 2023-10-05 VITALS — OXYGEN SATURATION: 97 %

## 2023-10-05 DIAGNOSIS — Z79.899: ICD-10-CM

## 2023-10-05 DIAGNOSIS — D64.9: ICD-10-CM

## 2023-10-05 DIAGNOSIS — J98.11: ICD-10-CM

## 2023-10-05 DIAGNOSIS — R44.3: ICD-10-CM

## 2023-10-05 DIAGNOSIS — H81.10: ICD-10-CM

## 2023-10-05 DIAGNOSIS — J96.11: ICD-10-CM

## 2023-10-05 DIAGNOSIS — Z99.81: ICD-10-CM

## 2023-10-05 DIAGNOSIS — I25.10: ICD-10-CM

## 2023-10-05 DIAGNOSIS — M19.90: ICD-10-CM

## 2023-10-05 DIAGNOSIS — E78.5: ICD-10-CM

## 2023-10-05 DIAGNOSIS — I10: ICD-10-CM

## 2023-10-05 DIAGNOSIS — Z87.891: ICD-10-CM

## 2023-10-05 DIAGNOSIS — J43.9: Primary | ICD-10-CM

## 2023-10-05 DIAGNOSIS — M81.0: ICD-10-CM

## 2023-10-05 DIAGNOSIS — H26.9: ICD-10-CM

## 2023-10-05 DIAGNOSIS — K74.3: ICD-10-CM

## 2023-10-05 DIAGNOSIS — Z88.6: ICD-10-CM

## 2023-10-05 DIAGNOSIS — Z90.49: ICD-10-CM

## 2023-10-05 DIAGNOSIS — K21.9: ICD-10-CM

## 2023-10-05 DIAGNOSIS — E86.0: ICD-10-CM

## 2023-10-05 DIAGNOSIS — Z79.51: ICD-10-CM

## 2023-10-05 LAB
BASOPHILS # BLD AUTO: 0.1 K/UL (ref 0–0.2)
BASOPHILS NFR BLD AUTO: 0.9 % (ref 0–2)
BUN SERPL-MCNC: 27 MG/DL (ref 7–18)
CALCIUM SERPL-MCNC: 10.3 MG/DL (ref 8.5–10.1)
CHLORIDE SERPL-SCNC: 103 MMOL/L (ref 98–107)
CO2 SERPL-SCNC: 31 MMOL/L (ref 21–32)
CREAT SERPL-MCNC: 1.1 MG/DL (ref 0.6–1.3)
EOSINOPHIL # BLD AUTO: 0.1 K/UL (ref 0–0.7)
EOSINOPHIL NFR BLD AUTO: 1.5 % (ref 0–7)
ERYTHROCYTE [DISTWIDTH] IN BLOOD BY AUTOMATED COUNT: 19.1 % (ref 12.3–17.7)
GLUCOSE SERPL-MCNC: 129 MG/DL (ref 74–106)
HCT VFR BLD AUTO: 33.8 % (ref 31.2–41.9)
HGB BLD-MCNC: 10.7 G/DL (ref 10.9–14.3)
LYMPHOCYTES # BLD AUTO: 0.6 K/UL (ref 0.8–4.8)
LYMPHOCYTES NFR BLD AUTO: 6 % (ref 20.5–51.5)
MANUAL DIF COMMENT BLD-IMP: 1
MCH RBC QN AUTO: 26.2 UUG (ref 24.7–32.8)
MCHC RBC AUTO-ENTMCNC: 32 G/DL (ref 32.3–35.6)
MCV RBC AUTO: 82.9 FL (ref 75.5–95.3)
MONOCYTES # BLD AUTO: 0.5 K/UL (ref 0.1–1.3)
MONOCYTES NFR BLD AUTO: 5.5 % (ref 0–11)
NEUTROPHILS # BLD AUTO: 8.5 K/UL (ref 1.8–8.9)
NEUTROPHILS NFR BLD AUTO: 86.1 % (ref 38.5–71.5)
NT-PROBNP SERPL-MCNC: 121 PG/ML (ref 0–125)
PLATELET # BLD AUTO: 249 K/UL (ref 179–408)
POTASSIUM SERPL-SCNC: 3.6 MMOL/L (ref 3.5–5.1)
RBC # BLD AUTO: 4.08 MIL/UL (ref 3.63–4.92)
SODIUM SERPL-SCNC: 137 MMOL/L (ref 136–145)
WBC # BLD AUTO: 9.8 K/UL (ref 3.8–11.8)

## 2023-10-05 PROCEDURE — G0378 HOSPITAL OBSERVATION PER HR: HCPCS

## 2023-10-05 PROCEDURE — A4663 DIALYSIS BLOOD PRESSURE CUFF: HCPCS

## 2023-10-05 RX ADMIN — CITALOPRAM HYDROBROMIDE SCH MG: 20 TABLET, FILM COATED ORAL at 09:15

## 2023-10-05 RX ADMIN — ALBUTEROL SULFATE SCH MG: 2.5 SOLUTION RESPIRATORY (INHALATION) at 19:57

## 2023-10-05 RX ADMIN — FOLIC ACID SCH MG: 1 TABLET ORAL at 09:15

## 2023-10-05 RX ADMIN — IPRATROPIUM BROMIDE SCH MG: 0.5 SOLUTION RESPIRATORY (INHALATION) at 11:30

## 2023-10-05 RX ADMIN — AMLODIPINE BESYLATE SCH MG: 5 TABLET ORAL at 17:42

## 2023-10-05 RX ADMIN — GABAPENTIN SCH MG: 100 CAPSULE ORAL at 09:15

## 2023-10-05 RX ADMIN — ALBUTEROL SULFATE SCH MG: 2.5 SOLUTION RESPIRATORY (INHALATION) at 23:54

## 2023-10-05 RX ADMIN — ATORVASTATIN CALCIUM SCH MG: 10 TABLET, FILM COATED ORAL at 21:20

## 2023-10-05 RX ADMIN — ASPIRIN SCH MG: 81 TABLET, CHEWABLE ORAL at 11:20

## 2023-10-05 RX ADMIN — ALBUTEROL SULFATE SCH MG: 2.5 SOLUTION RESPIRATORY (INHALATION) at 16:01

## 2023-10-05 RX ADMIN — LOSARTAN POTASSIUM SCH MG: 50 TABLET, FILM COATED ORAL at 17:42

## 2023-10-05 RX ADMIN — ENOXAPARIN SODIUM SCH MG: 30 INJECTION SUBCUTANEOUS at 11:21

## 2023-10-05 RX ADMIN — GABAPENTIN SCH MG: 100 CAPSULE ORAL at 14:07

## 2023-10-05 RX ADMIN — ALBUTEROL SULFATE SCH MG: 2.5 SOLUTION RESPIRATORY (INHALATION) at 11:30

## 2023-10-05 RX ADMIN — GABAPENTIN SCH MG: 100 CAPSULE ORAL at 16:23

## 2023-10-05 RX ADMIN — FLUTICASONE PROPIONATE SCH GM: 50 SPRAY, METERED NASAL at 09:15

## 2023-10-05 RX ADMIN — IPRATROPIUM BROMIDE SCH MG: 0.5 SOLUTION RESPIRATORY (INHALATION) at 16:01

## 2023-10-05 RX ADMIN — IPRATROPIUM BROMIDE SCH MG: 0.5 SOLUTION RESPIRATORY (INHALATION) at 23:54

## 2023-10-05 RX ADMIN — IPRATROPIUM BROMIDE SCH MG: 0.5 SOLUTION RESPIRATORY (INHALATION) at 19:57

## 2023-10-05 RX ADMIN — FLUTICASONE FUROATE AND VILANTEROL TRIFENATATE SCH EACH: 100; 25 POWDER RESPIRATORY (INHALATION) at 09:15

## 2023-10-06 VITALS — DIASTOLIC BLOOD PRESSURE: 62 MMHG | TEMPERATURE: 96.9 F | OXYGEN SATURATION: 96 % | SYSTOLIC BLOOD PRESSURE: 125 MMHG

## 2023-10-06 VITALS — DIASTOLIC BLOOD PRESSURE: 59 MMHG | OXYGEN SATURATION: 95 % | SYSTOLIC BLOOD PRESSURE: 107 MMHG | TEMPERATURE: 98.8 F

## 2023-10-06 VITALS — OXYGEN SATURATION: 99 %

## 2023-10-06 VITALS — TEMPERATURE: 97.7 F | DIASTOLIC BLOOD PRESSURE: 56 MMHG | SYSTOLIC BLOOD PRESSURE: 118 MMHG | OXYGEN SATURATION: 96 %

## 2023-10-06 VITALS — SYSTOLIC BLOOD PRESSURE: 124 MMHG | OXYGEN SATURATION: 94 % | DIASTOLIC BLOOD PRESSURE: 53 MMHG | TEMPERATURE: 97.8 F

## 2023-10-06 VITALS — TEMPERATURE: 97.3 F | OXYGEN SATURATION: 98 % | SYSTOLIC BLOOD PRESSURE: 131 MMHG | DIASTOLIC BLOOD PRESSURE: 61 MMHG

## 2023-10-06 VITALS — OXYGEN SATURATION: 98 %

## 2023-10-06 VITALS — OXYGEN SATURATION: 95 %

## 2023-10-06 VITALS — OXYGEN SATURATION: 96 %

## 2023-10-06 LAB
BASOPHILS # BLD AUTO: 0 K/UL (ref 0–0.2)
BASOPHILS NFR BLD AUTO: 0.3 % (ref 0–2)
BUN SERPL-MCNC: 23 MG/DL (ref 7–18)
CALCIUM SERPL-MCNC: 9.4 MG/DL (ref 8.5–10.1)
CHLORIDE SERPL-SCNC: 103 MMOL/L (ref 98–107)
CHOLEST SERPL-MCNC: 112 MG/DL (ref ?–200)
CO2 SERPL-SCNC: 30 MMOL/L (ref 21–32)
CREAT SERPL-MCNC: 1 MG/DL (ref 0.6–1.3)
EOSINOPHIL # BLD AUTO: 0 K/UL (ref 0–0.7)
EOSINOPHIL NFR BLD AUTO: 0 % (ref 0–7)
ERYTHROCYTE [DISTWIDTH] IN BLOOD BY AUTOMATED COUNT: 19 % (ref 12.3–17.7)
GLUCOSE SERPL-MCNC: 134 MG/DL (ref 74–106)
HCT VFR BLD AUTO: 31.5 % (ref 31.2–41.9)
HDLC SERPL-MCNC: 34 MG/DL (ref 40–60)
HGB BLD-MCNC: 10.2 G/DL (ref 10.9–14.3)
LYMPHOCYTES # BLD AUTO: 0.4 K/UL (ref 0.8–4.8)
LYMPHOCYTES NFR BLD AUTO: 9 % (ref 20.5–51.5)
MAGNESIUM SERPL-MCNC: 2 MG/DL (ref 1.8–2.4)
MANUAL DIF COMMENT BLD-IMP: 1
MCH RBC QN AUTO: 26.4 UUG (ref 24.7–32.8)
MCHC RBC AUTO-ENTMCNC: 32 G/DL (ref 32.3–35.6)
MCV RBC AUTO: 81.8 FL (ref 75.5–95.3)
MONOCYTES # BLD AUTO: 0.1 K/UL (ref 0.1–1.3)
MONOCYTES NFR BLD AUTO: 2.7 % (ref 0–11)
NEUTROPHILS # BLD AUTO: 4 K/UL (ref 1.8–8.9)
NEUTROPHILS NFR BLD AUTO: 88 % (ref 38.5–71.5)
PHOSPHATE SERPL-MCNC: 3.6 MG/DL (ref 2.5–4.9)
PLATELET # BLD AUTO: 234 K/UL (ref 179–408)
POTASSIUM SERPL-SCNC: 4 MMOL/L (ref 3.5–5.1)
RBC # BLD AUTO: 3.85 MIL/UL (ref 3.63–4.92)
SODIUM SERPL-SCNC: 137 MMOL/L (ref 136–145)
TRIGL SERPL-MCNC: 178 MG/DL (ref 30–150)
WBC # BLD AUTO: 4.5 K/UL (ref 3.8–11.8)

## 2023-10-06 RX ADMIN — FLUTICASONE FUROATE AND VILANTEROL TRIFENATATE SCH EACH: 100; 25 POWDER RESPIRATORY (INHALATION) at 10:34

## 2023-10-06 RX ADMIN — IPRATROPIUM BROMIDE SCH MG: 0.5 SOLUTION RESPIRATORY (INHALATION) at 03:49

## 2023-10-06 RX ADMIN — CITALOPRAM HYDROBROMIDE SCH MG: 20 TABLET, FILM COATED ORAL at 10:25

## 2023-10-06 RX ADMIN — URSODIOL SCH MG: 300 CAPSULE ORAL at 21:04

## 2023-10-06 RX ADMIN — ENOXAPARIN SODIUM SCH MG: 30 INJECTION SUBCUTANEOUS at 10:28

## 2023-10-06 RX ADMIN — PANTOPRAZOLE SODIUM SCH MG: 40 TABLET, DELAYED RELEASE ORAL at 06:26

## 2023-10-06 RX ADMIN — IPRATROPIUM BROMIDE SCH MG: 0.5 SOLUTION RESPIRATORY (INHALATION) at 10:53

## 2023-10-06 RX ADMIN — ALBUTEROL SULFATE SCH MG: 2.5 SOLUTION RESPIRATORY (INHALATION) at 10:54

## 2023-10-06 RX ADMIN — URSODIOL SCH MG: 300 CAPSULE ORAL at 10:32

## 2023-10-06 RX ADMIN — ALBUTEROL SULFATE SCH MG: 2.5 SOLUTION RESPIRATORY (INHALATION) at 03:50

## 2023-10-06 RX ADMIN — ASPIRIN SCH MG: 81 TABLET, CHEWABLE ORAL at 10:25

## 2023-10-06 RX ADMIN — IPRATROPIUM BROMIDE SCH MG: 0.5 SOLUTION RESPIRATORY (INHALATION) at 15:29

## 2023-10-06 RX ADMIN — AMLODIPINE BESYLATE SCH MG: 5 TABLET ORAL at 10:26

## 2023-10-06 RX ADMIN — FOLIC ACID SCH MG: 1 TABLET ORAL at 10:26

## 2023-10-06 RX ADMIN — IPRATROPIUM BROMIDE SCH MG: 0.5 SOLUTION RESPIRATORY (INHALATION) at 20:47

## 2023-10-06 RX ADMIN — ALBUTEROL SULFATE SCH MG: 2.5 SOLUTION RESPIRATORY (INHALATION) at 15:29

## 2023-10-06 RX ADMIN — LOSARTAN POTASSIUM SCH MG: 50 TABLET, FILM COATED ORAL at 10:27

## 2023-10-06 RX ADMIN — AZITHROMYCIN SCH MG: 250 TABLET, FILM COATED ORAL at 10:26

## 2023-10-06 RX ADMIN — FLUTICASONE PROPIONATE SCH GM: 50 SPRAY, METERED NASAL at 10:33

## 2023-10-06 RX ADMIN — ATORVASTATIN CALCIUM SCH MG: 10 TABLET, FILM COATED ORAL at 20:29

## 2023-10-06 RX ADMIN — GABAPENTIN SCH MG: 100 CAPSULE ORAL at 12:48

## 2023-10-06 RX ADMIN — GABAPENTIN SCH MG: 100 CAPSULE ORAL at 17:00

## 2023-10-06 RX ADMIN — GABAPENTIN SCH MG: 100 CAPSULE ORAL at 10:25

## 2023-10-06 RX ADMIN — GABAPENTIN SCH MG: 100 CAPSULE ORAL at 12:52

## 2023-10-06 RX ADMIN — IPRATROPIUM BROMIDE SCH MG: 0.5 SOLUTION RESPIRATORY (INHALATION) at 08:10

## 2023-10-06 RX ADMIN — ALBUTEROL SULFATE SCH MG: 2.5 SOLUTION RESPIRATORY (INHALATION) at 20:47

## 2023-10-06 RX ADMIN — ALBUTEROL SULFATE SCH MG: 2.5 SOLUTION RESPIRATORY (INHALATION) at 08:09

## 2023-10-07 VITALS — OXYGEN SATURATION: 98 %

## 2023-10-07 VITALS — OXYGEN SATURATION: 95 % | DIASTOLIC BLOOD PRESSURE: 52 MMHG | SYSTOLIC BLOOD PRESSURE: 119 MMHG | TEMPERATURE: 97.5 F

## 2023-10-07 VITALS — DIASTOLIC BLOOD PRESSURE: 76 MMHG | SYSTOLIC BLOOD PRESSURE: 155 MMHG | OXYGEN SATURATION: 91 % | TEMPERATURE: 97 F

## 2023-10-07 VITALS — OXYGEN SATURATION: 96 %

## 2023-10-07 VITALS — SYSTOLIC BLOOD PRESSURE: 105 MMHG | TEMPERATURE: 98.1 F | OXYGEN SATURATION: 94 % | DIASTOLIC BLOOD PRESSURE: 71 MMHG

## 2023-10-07 VITALS — OXYGEN SATURATION: 99 %

## 2023-10-07 VITALS — TEMPERATURE: 98 F | DIASTOLIC BLOOD PRESSURE: 65 MMHG | SYSTOLIC BLOOD PRESSURE: 133 MMHG | OXYGEN SATURATION: 100 %

## 2023-10-07 RX ADMIN — IPRATROPIUM BROMIDE SCH MG: 0.5 SOLUTION RESPIRATORY (INHALATION) at 07:56

## 2023-10-07 RX ADMIN — IPRATROPIUM BROMIDE SCH MG: 0.5 SOLUTION RESPIRATORY (INHALATION) at 03:30

## 2023-10-07 RX ADMIN — ASPIRIN SCH MG: 81 TABLET, CHEWABLE ORAL at 08:14

## 2023-10-07 RX ADMIN — ALBUTEROL SULFATE SCH MG: 2.5 SOLUTION RESPIRATORY (INHALATION) at 03:30

## 2023-10-07 RX ADMIN — FLUTICASONE PROPIONATE SCH GM: 50 SPRAY, METERED NASAL at 08:24

## 2023-10-07 RX ADMIN — GABAPENTIN SCH MG: 100 CAPSULE ORAL at 12:22

## 2023-10-07 RX ADMIN — ATORVASTATIN CALCIUM SCH MG: 10 TABLET, FILM COATED ORAL at 20:25

## 2023-10-07 RX ADMIN — ALBUTEROL SULFATE SCH MG: 2.5 SOLUTION RESPIRATORY (INHALATION) at 23:30

## 2023-10-07 RX ADMIN — IPRATROPIUM BROMIDE SCH MG: 0.5 SOLUTION RESPIRATORY (INHALATION) at 23:30

## 2023-10-07 RX ADMIN — GABAPENTIN SCH MG: 100 CAPSULE ORAL at 16:12

## 2023-10-07 RX ADMIN — URSODIOL SCH MG: 300 CAPSULE ORAL at 08:14

## 2023-10-07 RX ADMIN — FOLIC ACID SCH MG: 1 TABLET ORAL at 08:14

## 2023-10-07 RX ADMIN — FLUTICASONE FUROATE AND VILANTEROL TRIFENATATE SCH EACH: 100; 25 POWDER RESPIRATORY (INHALATION) at 08:24

## 2023-10-07 RX ADMIN — ALBUTEROL SULFATE SCH MG: 2.5 SOLUTION RESPIRATORY (INHALATION) at 11:47

## 2023-10-07 RX ADMIN — LOSARTAN POTASSIUM SCH MG: 50 TABLET, FILM COATED ORAL at 08:14

## 2023-10-07 RX ADMIN — ALBUTEROL SULFATE SCH MG: 2.5 SOLUTION RESPIRATORY (INHALATION) at 15:11

## 2023-10-07 RX ADMIN — ENOXAPARIN SODIUM SCH MG: 40 INJECTION SUBCUTANEOUS at 10:07

## 2023-10-07 RX ADMIN — IPRATROPIUM BROMIDE SCH MG: 0.5 SOLUTION RESPIRATORY (INHALATION) at 20:34

## 2023-10-07 RX ADMIN — URSODIOL SCH MG: 300 CAPSULE ORAL at 20:25

## 2023-10-07 RX ADMIN — ALBUTEROL SULFATE SCH MG: 2.5 SOLUTION RESPIRATORY (INHALATION) at 07:56

## 2023-10-07 RX ADMIN — GABAPENTIN SCH MG: 100 CAPSULE ORAL at 08:14

## 2023-10-07 RX ADMIN — AZITHROMYCIN SCH MG: 250 TABLET, FILM COATED ORAL at 08:19

## 2023-10-07 RX ADMIN — ALBUTEROL SULFATE SCH MG: 2.5 SOLUTION RESPIRATORY (INHALATION) at 20:34

## 2023-10-07 RX ADMIN — IPRATROPIUM BROMIDE SCH MG: 0.5 SOLUTION RESPIRATORY (INHALATION) at 11:47

## 2023-10-07 RX ADMIN — PANTOPRAZOLE SODIUM SCH MG: 40 TABLET, DELAYED RELEASE ORAL at 07:00

## 2023-10-07 RX ADMIN — IPRATROPIUM BROMIDE SCH MG: 0.5 SOLUTION RESPIRATORY (INHALATION) at 15:11

## 2023-10-07 RX ADMIN — CITALOPRAM HYDROBROMIDE SCH MG: 20 TABLET, FILM COATED ORAL at 08:14

## 2023-10-07 RX ADMIN — IPRATROPIUM BROMIDE SCH MG: 0.5 SOLUTION RESPIRATORY (INHALATION) at 00:11

## 2023-10-07 RX ADMIN — AMLODIPINE BESYLATE SCH MG: 5 TABLET ORAL at 08:14

## 2023-10-07 RX ADMIN — ALBUTEROL SULFATE SCH MG: 2.5 SOLUTION RESPIRATORY (INHALATION) at 00:12

## 2023-10-08 VITALS — TEMPERATURE: 98.3 F | OXYGEN SATURATION: 98 % | DIASTOLIC BLOOD PRESSURE: 79 MMHG | SYSTOLIC BLOOD PRESSURE: 158 MMHG

## 2023-10-08 VITALS — DIASTOLIC BLOOD PRESSURE: 72 MMHG | SYSTOLIC BLOOD PRESSURE: 131 MMHG | TEMPERATURE: 98.2 F | OXYGEN SATURATION: 99 %

## 2023-10-08 VITALS — OXYGEN SATURATION: 99 %

## 2023-10-08 VITALS — OXYGEN SATURATION: 96 %

## 2023-10-08 VITALS — OXYGEN SATURATION: 98 % | SYSTOLIC BLOOD PRESSURE: 124 MMHG | DIASTOLIC BLOOD PRESSURE: 64 MMHG | TEMPERATURE: 98.4 F

## 2023-10-08 VITALS — OXYGEN SATURATION: 98 %

## 2023-10-08 RX ADMIN — FLUTICASONE PROPIONATE SCH GM: 50 SPRAY, METERED NASAL at 08:38

## 2023-10-08 RX ADMIN — GABAPENTIN SCH MG: 100 CAPSULE ORAL at 12:14

## 2023-10-08 RX ADMIN — ALBUTEROL SULFATE SCH MG: 2.5 SOLUTION RESPIRATORY (INHALATION) at 07:35

## 2023-10-08 RX ADMIN — ASPIRIN SCH MG: 81 TABLET, CHEWABLE ORAL at 08:18

## 2023-10-08 RX ADMIN — GABAPENTIN SCH MG: 100 CAPSULE ORAL at 08:37

## 2023-10-08 RX ADMIN — PANTOPRAZOLE SODIUM SCH MG: 40 TABLET, DELAYED RELEASE ORAL at 06:31

## 2023-10-08 RX ADMIN — URSODIOL SCH MG: 300 CAPSULE ORAL at 08:18

## 2023-10-08 RX ADMIN — ALBUTEROL SULFATE SCH MG: 2.5 SOLUTION RESPIRATORY (INHALATION) at 11:22

## 2023-10-08 RX ADMIN — AZITHROMYCIN SCH MG: 250 TABLET, FILM COATED ORAL at 08:18

## 2023-10-08 RX ADMIN — FLUTICASONE FUROATE AND VILANTEROL TRIFENATATE SCH EACH: 100; 25 POWDER RESPIRATORY (INHALATION) at 08:38

## 2023-10-08 RX ADMIN — ALBUTEROL SULFATE SCH MG: 2.5 SOLUTION RESPIRATORY (INHALATION) at 03:49

## 2023-10-08 RX ADMIN — FOLIC ACID SCH MG: 1 TABLET ORAL at 08:18

## 2023-10-08 RX ADMIN — IPRATROPIUM BROMIDE SCH MG: 0.5 SOLUTION RESPIRATORY (INHALATION) at 07:35

## 2023-10-08 RX ADMIN — IPRATROPIUM BROMIDE SCH MG: 0.5 SOLUTION RESPIRATORY (INHALATION) at 11:22

## 2023-10-08 RX ADMIN — IPRATROPIUM BROMIDE SCH MG: 0.5 SOLUTION RESPIRATORY (INHALATION) at 03:49

## 2023-10-08 RX ADMIN — CITALOPRAM HYDROBROMIDE SCH MG: 20 TABLET, FILM COATED ORAL at 08:19

## 2023-10-08 RX ADMIN — ENOXAPARIN SODIUM SCH MG: 40 INJECTION SUBCUTANEOUS at 10:33

## 2023-10-08 RX ADMIN — AMLODIPINE BESYLATE SCH MG: 5 TABLET ORAL at 08:18

## 2023-10-08 RX ADMIN — LOSARTAN POTASSIUM SCH MG: 50 TABLET, FILM COATED ORAL at 08:19

## 2023-11-01 ENCOUNTER — HOSPITAL ENCOUNTER (INPATIENT)
Dept: HOSPITAL 12 - ER | Age: 78
LOS: 3 days | Discharge: NURSING, REHAB OR LONG-TERM CARE | DRG: 190 | End: 2023-11-04
Payer: MEDICARE

## 2023-11-01 VITALS — WEIGHT: 120.37 LBS | BODY MASS INDEX: 23.63 KG/M2 | HEIGHT: 60 IN

## 2023-11-01 VITALS — OXYGEN SATURATION: 93 %

## 2023-11-01 VITALS — OXYGEN SATURATION: 98 %

## 2023-11-01 VITALS — OXYGEN SATURATION: 97 %

## 2023-11-01 DIAGNOSIS — J44.0: ICD-10-CM

## 2023-11-01 DIAGNOSIS — J18.9: ICD-10-CM

## 2023-11-01 DIAGNOSIS — J96.21: ICD-10-CM

## 2023-11-01 DIAGNOSIS — E78.5: ICD-10-CM

## 2023-11-01 DIAGNOSIS — M81.0: ICD-10-CM

## 2023-11-01 DIAGNOSIS — H26.9: ICD-10-CM

## 2023-11-01 DIAGNOSIS — Z79.51: ICD-10-CM

## 2023-11-01 DIAGNOSIS — Z87.891: ICD-10-CM

## 2023-11-01 DIAGNOSIS — Z99.81: ICD-10-CM

## 2023-11-01 DIAGNOSIS — D64.9: ICD-10-CM

## 2023-11-01 DIAGNOSIS — I11.9: ICD-10-CM

## 2023-11-01 DIAGNOSIS — Z88.6: ICD-10-CM

## 2023-11-01 DIAGNOSIS — M19.90: ICD-10-CM

## 2023-11-01 DIAGNOSIS — K74.3: ICD-10-CM

## 2023-11-01 DIAGNOSIS — F32.A: ICD-10-CM

## 2023-11-01 DIAGNOSIS — Z79.899: ICD-10-CM

## 2023-11-01 DIAGNOSIS — J43.9: Primary | ICD-10-CM

## 2023-11-01 DIAGNOSIS — Z91.011: ICD-10-CM

## 2023-11-01 LAB
ALBUMIN SERPL BCG-MCNC: 2.8 G/DL (ref 3.4–5)
ALP SERPL-CCNC: 359 U/L (ref 50–136)
ALT SERPL W/O P-5'-P-CCNC: 31 U/L (ref 14–59)
APPEARANCE UR: CLEAR
AST SERPL-CCNC: 31 U/L (ref 15–37)
BASE EXCESS BLDA CALC-SCNC: -0.2 MMOL/L
BASOPHILS # BLD AUTO: 0 K/UL (ref 0–0.2)
BASOPHILS NFR BLD AUTO: 0.3 % (ref 0–2)
BILIRUB DIRECT SERPL-MCNC: 0.8 MG/DL (ref 0–0.2)
BILIRUB SERPL-MCNC: 1.3 MG/DL (ref 0.2–1)
BILIRUB UR QL STRIP: (no result)
BUN SERPL-MCNC: 26 MG/DL (ref 7–18)
CALCIUM SERPL-MCNC: 9 MG/DL (ref 8.5–10.1)
CHLORIDE SERPL-SCNC: 99 MMOL/L (ref 98–107)
CO2 SERPL-SCNC: 25 MMOL/L (ref 21–32)
COLOR UR: YELLOW
CREAT SERPL-MCNC: 1.1 MG/DL (ref 0.6–1.3)
EOSINOPHIL # BLD AUTO: 0 K/UL (ref 0–0.7)
EOSINOPHIL NFR BLD AUTO: 0.5 % (ref 0–7)
ERYTHROCYTE [DISTWIDTH] IN BLOOD BY AUTOMATED COUNT: 18.9 % (ref 12.3–17.7)
GLUCOSE SERPL-MCNC: 104 MG/DL (ref 74–106)
GLUCOSE UR STRIP-MCNC: NEGATIVE MG/DL
HCO3 BLDA-SCNC: 23.1 MMOL/L
HCT VFR BLD AUTO: 33.6 % (ref 31.2–41.9)
HGB BLD-MCNC: 10.7 G/DL (ref 10.9–14.3)
HGB BLDA OXIMETRY-MCNC: 11 G/DL (ref 12–16)
HGB UR QL STRIP: NEGATIVE
INHALED O2 CONCENTRATION: 36 %
INHALED O2 FLOW RATE: 4 L/MIN (ref 0–30)
KETONES UR STRIP-MCNC: NEGATIVE MG/DL
LEUKOCYTE ESTERASE UR QL STRIP: NEGATIVE
LYMPHOCYTES # BLD AUTO: 0.4 K/UL (ref 0.8–4.8)
LYMPHOCYTES NFR BLD AUTO: 4.4 % (ref 20.5–51.5)
MANUAL DIF COMMENT BLD-IMP: 0
MCH RBC QN AUTO: 27.3 UUG (ref 24.7–32.8)
MCHC RBC AUTO-ENTMCNC: 32 G/DL (ref 32.3–35.6)
MCV RBC AUTO: 85.9 FL (ref 75.5–95.3)
MONOCYTES # BLD AUTO: 1.2 K/UL (ref 0.1–1.3)
MONOCYTES NFR BLD AUTO: 12.6 % (ref 0–11)
NEUTROPHILS # BLD AUTO: 7.6 K/UL (ref 1.8–8.9)
NEUTROPHILS NFR BLD AUTO: 82.2 % (ref 38.5–71.5)
NITRITE UR QL STRIP: NEGATIVE
NT-PROBNP SERPL-MCNC: 1600 PG/ML (ref 0–125)
PCO2 TEMP ADJ BLDA: 33.2 MMHG (ref 35–45)
PH TEMP ADJ BLDA: 7.46 [PH] (ref 7.35–7.45)
PH UR STRIP: 5.5 [PH] (ref 5–8)
PLATELET # BLD AUTO: 276 K/UL (ref 179–408)
PO2 TEMP ADJ BLDA: 68.1 MMHG (ref 75–100)
POTASSIUM SERPL-SCNC: 3.3 MMOL/L (ref 3.5–5.1)
PROT UR QL STRIP: (no result)
RBC # BLD AUTO: 3.91 MIL/UL (ref 3.63–4.92)
SODIUM SERPL-SCNC: 136 MMOL/L (ref 136–145)
SP GR UR STRIP: >=1.03 (ref 1–1.03)
SPECIMEN DRAWN FROM PATIENT: (no result)
UROBILINOGEN UR STRIP-MCNC: 1 E.U./DL
WBC # BLD AUTO: 9.2 K/UL (ref 3.8–11.8)
WS STN SPEC: 6.7 G/DL (ref 6.4–8.2)

## 2023-11-01 PROCEDURE — G0378 HOSPITAL OBSERVATION PER HR: HCPCS

## 2023-11-01 PROCEDURE — A4663 DIALYSIS BLOOD PRESSURE CUFF: HCPCS

## 2023-11-01 PROCEDURE — A4606 OXYGEN PROBE USED W OXIMETER: HCPCS

## 2023-11-02 VITALS — DIASTOLIC BLOOD PRESSURE: 66 MMHG | SYSTOLIC BLOOD PRESSURE: 139 MMHG | OXYGEN SATURATION: 97 % | TEMPERATURE: 97.9 F

## 2023-11-02 VITALS — OXYGEN SATURATION: 98 %

## 2023-11-02 VITALS — DIASTOLIC BLOOD PRESSURE: 59 MMHG | OXYGEN SATURATION: 99 % | TEMPERATURE: 98.2 F | SYSTOLIC BLOOD PRESSURE: 118 MMHG

## 2023-11-02 VITALS — OXYGEN SATURATION: 93 % | DIASTOLIC BLOOD PRESSURE: 72 MMHG | TEMPERATURE: 98.6 F | SYSTOLIC BLOOD PRESSURE: 141 MMHG

## 2023-11-02 VITALS — DIASTOLIC BLOOD PRESSURE: 76 MMHG | TEMPERATURE: 98.3 F | OXYGEN SATURATION: 95 % | SYSTOLIC BLOOD PRESSURE: 145 MMHG

## 2023-11-02 VITALS — OXYGEN SATURATION: 97 %

## 2023-11-02 VITALS — OXYGEN SATURATION: 93 %

## 2023-11-02 LAB
BASOPHILS # BLD AUTO: 0 K/UL (ref 0–0.2)
BASOPHILS NFR BLD AUTO: 0.1 % (ref 0–2)
BUN SERPL-MCNC: 28 MG/DL (ref 7–18)
CALCIUM SERPL-MCNC: 8.4 MG/DL (ref 8.5–10.1)
CHLORIDE SERPL-SCNC: 103 MMOL/L (ref 98–107)
CHOLEST SERPL-MCNC: 143 MG/DL (ref ?–200)
CO2 SERPL-SCNC: 26 MMOL/L (ref 21–32)
CREAT SERPL-MCNC: 1 MG/DL (ref 0.6–1.3)
EOSINOPHIL # BLD AUTO: 0 K/UL (ref 0–0.7)
EOSINOPHIL NFR BLD AUTO: 0 % (ref 0–7)
ERYTHROCYTE [DISTWIDTH] IN BLOOD BY AUTOMATED COUNT: 18.7 % (ref 12.3–17.7)
GLUCOSE SERPL-MCNC: 114 MG/DL (ref 74–106)
HCT VFR BLD AUTO: 27.7 % (ref 31.2–41.9)
HDLC SERPL-MCNC: 64 MG/DL (ref 40–60)
HGB BLD-MCNC: 8.9 G/DL (ref 10.9–14.3)
LYMPHOCYTES # BLD AUTO: 0.4 K/UL (ref 0.8–4.8)
LYMPHOCYTES NFR BLD AUTO: 5.9 % (ref 20.5–51.5)
MAGNESIUM SERPL-MCNC: 2 MG/DL (ref 1.8–2.4)
MANUAL DIF COMMENT BLD-IMP: 1
MCH RBC QN AUTO: 27.6 UUG (ref 24.7–32.8)
MCHC RBC AUTO-ENTMCNC: 32 G/DL (ref 32.3–35.6)
MCV RBC AUTO: 85.5 FL (ref 75.5–95.3)
MONOCYTES # BLD AUTO: 0.6 K/UL (ref 0.1–1.3)
MONOCYTES NFR BLD AUTO: 7.4 % (ref 0–11)
NEUTROPHILS # BLD AUTO: 6.6 K/UL (ref 1.8–8.9)
NEUTROPHILS NFR BLD AUTO: 86.6 % (ref 38.5–71.5)
PHOSPHATE SERPL-MCNC: 2.5 MG/DL (ref 2.5–4.9)
PLATELET # BLD AUTO: 263 K/UL (ref 179–408)
POTASSIUM SERPL-SCNC: 3.7 MMOL/L (ref 3.5–5.1)
RBC # BLD AUTO: 3.24 MIL/UL (ref 3.63–4.92)
SODIUM SERPL-SCNC: 137 MMOL/L (ref 136–145)
TRIGL SERPL-MCNC: 24 MG/DL (ref 30–150)
WBC # BLD AUTO: 7.6 K/UL (ref 3.8–11.8)

## 2023-11-02 RX ADMIN — ENOXAPARIN SODIUM SCH MG: 40 INJECTION SUBCUTANEOUS at 20:42

## 2023-11-02 RX ADMIN — ENOXAPARIN SODIUM SCH MG: 40 INJECTION SUBCUTANEOUS at 01:37

## 2023-11-02 RX ADMIN — PANTOPRAZOLE SODIUM SCH MG: 40 TABLET, DELAYED RELEASE ORAL at 06:11

## 2023-11-02 RX ADMIN — IPRATROPIUM BROMIDE SCH MG: 0.5 SOLUTION RESPIRATORY (INHALATION) at 08:54

## 2023-11-02 RX ADMIN — ALBUTEROL SULFATE SCH MG: 2.5 SOLUTION RESPIRATORY (INHALATION) at 13:38

## 2023-11-02 RX ADMIN — ATORVASTATIN CALCIUM SCH MG: 10 TABLET, FILM COATED ORAL at 21:03

## 2023-11-02 RX ADMIN — IPRATROPIUM BROMIDE SCH MG: 0.5 SOLUTION RESPIRATORY (INHALATION) at 19:38

## 2023-11-02 RX ADMIN — ALBUTEROL SULFATE SCH MG: 2.5 SOLUTION RESPIRATORY (INHALATION) at 08:54

## 2023-11-02 RX ADMIN — ALBUTEROL SULFATE SCH MG: 2.5 SOLUTION RESPIRATORY (INHALATION) at 19:38

## 2023-11-02 RX ADMIN — URSODIOL SCH MG: 300 CAPSULE ORAL at 21:00

## 2023-11-02 RX ADMIN — DEXTROSE SCH MLS/HR: 50 INJECTION, SOLUTION INTRAVENOUS at 17:35

## 2023-11-02 RX ADMIN — IPRATROPIUM BROMIDE SCH MG: 0.5 SOLUTION RESPIRATORY (INHALATION) at 13:38

## 2023-11-03 VITALS — TEMPERATURE: 98.6 F | SYSTOLIC BLOOD PRESSURE: 117 MMHG | DIASTOLIC BLOOD PRESSURE: 52 MMHG | OXYGEN SATURATION: 96 %

## 2023-11-03 VITALS — OXYGEN SATURATION: 100 % | SYSTOLIC BLOOD PRESSURE: 141 MMHG | TEMPERATURE: 97.4 F | DIASTOLIC BLOOD PRESSURE: 61 MMHG

## 2023-11-03 VITALS — OXYGEN SATURATION: 98 %

## 2023-11-03 VITALS — OXYGEN SATURATION: 97 %

## 2023-11-03 VITALS — DIASTOLIC BLOOD PRESSURE: 71 MMHG | OXYGEN SATURATION: 94 % | SYSTOLIC BLOOD PRESSURE: 144 MMHG | TEMPERATURE: 99.5 F

## 2023-11-03 VITALS — OXYGEN SATURATION: 96 % | SYSTOLIC BLOOD PRESSURE: 135 MMHG | DIASTOLIC BLOOD PRESSURE: 74 MMHG | TEMPERATURE: 98.5 F

## 2023-11-03 VITALS — SYSTOLIC BLOOD PRESSURE: 104 MMHG | DIASTOLIC BLOOD PRESSURE: 55 MMHG | TEMPERATURE: 97.5 F | OXYGEN SATURATION: 94 %

## 2023-11-03 LAB
BASE EXCESS BLDA CALC-SCNC: 2.8 MMOL/L
BASOPHILS # BLD AUTO: 0 K/UL (ref 0–0.2)
BASOPHILS NFR BLD AUTO: 0.1 % (ref 0–2)
BUN SERPL-MCNC: 24 MG/DL (ref 7–18)
CALCIUM SERPL-MCNC: 8.7 MG/DL (ref 8.5–10.1)
CHLORIDE SERPL-SCNC: 104 MMOL/L (ref 98–107)
CO2 SERPL-SCNC: 26 MMOL/L (ref 21–32)
CREAT SERPL-MCNC: 0.9 MG/DL (ref 0.6–1.3)
EOSINOPHIL # BLD AUTO: 0 K/UL (ref 0–0.7)
EOSINOPHIL NFR BLD AUTO: 0 % (ref 0–7)
ERYTHROCYTE [DISTWIDTH] IN BLOOD BY AUTOMATED COUNT: 18.7 % (ref 12.3–17.7)
GLUCOSE SERPL-MCNC: 119 MG/DL (ref 74–106)
HCO3 BLDA-SCNC: 27.1 MMOL/L
HCT VFR BLD AUTO: 28.6 % (ref 31.2–41.9)
HGB BLD-MCNC: 9.3 G/DL (ref 10.9–14.3)
HGB BLDA OXIMETRY-MCNC: 10.8 G/DL (ref 12–16)
INHALED O2 CONCENTRATION: 32 %
INHALED O2 FLOW RATE: 3 L/MIN (ref 0–30)
LYMPHOCYTES # BLD AUTO: 0.5 K/UL (ref 0.8–4.8)
LYMPHOCYTES NFR BLD AUTO: 6.5 % (ref 20.5–51.5)
MAGNESIUM SERPL-MCNC: 2 MG/DL (ref 1.8–2.4)
MANUAL DIF COMMENT BLD-IMP: 1
MCH RBC QN AUTO: 27.8 UUG (ref 24.7–32.8)
MCHC RBC AUTO-ENTMCNC: 33 G/DL (ref 32.3–35.6)
MCV RBC AUTO: 85.3 FL (ref 75.5–95.3)
MONOCYTES # BLD AUTO: 0.5 K/UL (ref 0.1–1.3)
MONOCYTES NFR BLD AUTO: 6.2 % (ref 0–11)
NEUTROPHILS # BLD AUTO: 6.8 K/UL (ref 1.8–8.9)
NEUTROPHILS NFR BLD AUTO: 87.2 % (ref 38.5–71.5)
PCO2 TEMP ADJ BLDA: 40.6 MMHG (ref 35–45)
PH TEMP ADJ BLDA: 7.44 [PH] (ref 7.35–7.45)
PHOSPHATE SERPL-MCNC: 3.6 MG/DL (ref 2.5–4.9)
PLATELET # BLD AUTO: 295 K/UL (ref 179–408)
PO2 TEMP ADJ BLDA: 93.7 MMHG (ref 75–100)
POTASSIUM SERPL-SCNC: 4 MMOL/L (ref 3.5–5.1)
RBC # BLD AUTO: 3.35 MIL/UL (ref 3.63–4.92)
SODIUM SERPL-SCNC: 140 MMOL/L (ref 136–145)
SPECIMEN DRAWN FROM PATIENT: (no result)
WBC # BLD AUTO: 7.8 K/UL (ref 3.8–11.8)

## 2023-11-03 RX ADMIN — ALBUTEROL SULFATE SCH MG: 2.5 SOLUTION RESPIRATORY (INHALATION) at 08:06

## 2023-11-03 RX ADMIN — Medication SCH TAB: at 08:42

## 2023-11-03 RX ADMIN — ENOXAPARIN SODIUM SCH MG: 40 INJECTION SUBCUTANEOUS at 20:16

## 2023-11-03 RX ADMIN — FLUTICASONE PROPIONATE SCH GM: 50 SPRAY, METERED NASAL at 08:46

## 2023-11-03 RX ADMIN — AMLODIPINE BESYLATE SCH MG: 5 TABLET ORAL at 08:43

## 2023-11-03 RX ADMIN — ALBUTEROL SULFATE SCH MG: 2.5 SOLUTION RESPIRATORY (INHALATION) at 19:40

## 2023-11-03 RX ADMIN — PANTOPRAZOLE SODIUM SCH MG: 40 TABLET, DELAYED RELEASE ORAL at 06:34

## 2023-11-03 RX ADMIN — Medication SCH MG: at 08:43

## 2023-11-03 RX ADMIN — IPRATROPIUM BROMIDE SCH MG: 0.5 SOLUTION RESPIRATORY (INHALATION) at 08:06

## 2023-11-03 RX ADMIN — ASPIRIN SCH MG: 81 TABLET, CHEWABLE ORAL at 08:42

## 2023-11-03 RX ADMIN — DEXTROSE SCH MLS/HR: 50 INJECTION, SOLUTION INTRAVENOUS at 05:26

## 2023-11-03 RX ADMIN — IPRATROPIUM BROMIDE SCH MG: 0.5 SOLUTION RESPIRATORY (INHALATION) at 19:39

## 2023-11-03 RX ADMIN — DEXTROSE SCH MLS/HR: 50 INJECTION, SOLUTION INTRAVENOUS at 17:19

## 2023-11-03 RX ADMIN — VITAMIN D, TAB 1000IU (100/BT) SCH UNIT: 25 TAB at 08:42

## 2023-11-03 RX ADMIN — FOLIC ACID SCH MG: 1 TABLET ORAL at 08:43

## 2023-11-03 RX ADMIN — FLUTICASONE FUROATE AND VILANTEROL TRIFENATATE SCH EACH: 100; 25 POWDER RESPIRATORY (INHALATION) at 08:45

## 2023-11-03 RX ADMIN — LOSARTAN POTASSIUM SCH MG: 50 TABLET, FILM COATED ORAL at 08:43

## 2023-11-03 RX ADMIN — URSODIOL SCH MG: 300 CAPSULE ORAL at 20:15

## 2023-11-03 RX ADMIN — CITALOPRAM HYDROBROMIDE SCH MG: 20 TABLET, FILM COATED ORAL at 08:42

## 2023-11-03 RX ADMIN — ATORVASTATIN CALCIUM SCH MG: 10 TABLET, FILM COATED ORAL at 20:16

## 2023-11-03 RX ADMIN — ALBUTEROL SULFATE SCH MG: 2.5 SOLUTION RESPIRATORY (INHALATION) at 13:57

## 2023-11-03 RX ADMIN — IPRATROPIUM BROMIDE SCH MG: 0.5 SOLUTION RESPIRATORY (INHALATION) at 13:57

## 2023-11-04 VITALS — TEMPERATURE: 97.6 F | SYSTOLIC BLOOD PRESSURE: 144 MMHG | DIASTOLIC BLOOD PRESSURE: 76 MMHG | OXYGEN SATURATION: 97 %

## 2023-11-04 VITALS — OXYGEN SATURATION: 99 %

## 2023-11-04 VITALS — DIASTOLIC BLOOD PRESSURE: 73 MMHG | SYSTOLIC BLOOD PRESSURE: 144 MMHG | OXYGEN SATURATION: 97 % | TEMPERATURE: 97.2 F

## 2023-11-04 VITALS — TEMPERATURE: 98.6 F | OXYGEN SATURATION: 96 % | DIASTOLIC BLOOD PRESSURE: 73 MMHG | SYSTOLIC BLOOD PRESSURE: 150 MMHG

## 2023-11-04 VITALS — OXYGEN SATURATION: 97 %

## 2023-11-04 RX ADMIN — PANTOPRAZOLE SODIUM SCH MG: 40 TABLET, DELAYED RELEASE ORAL at 06:14

## 2023-11-04 RX ADMIN — FLUTICASONE PROPIONATE SCH GM: 50 SPRAY, METERED NASAL at 08:28

## 2023-11-04 RX ADMIN — IPRATROPIUM BROMIDE SCH MG: 0.5 SOLUTION RESPIRATORY (INHALATION) at 08:15

## 2023-11-04 RX ADMIN — ALBUTEROL SULFATE SCH MG: 2.5 SOLUTION RESPIRATORY (INHALATION) at 08:15

## 2023-11-04 RX ADMIN — VITAMIN D, TAB 1000IU (100/BT) SCH UNIT: 25 TAB at 08:33

## 2023-11-04 RX ADMIN — FLUTICASONE FUROATE AND VILANTEROL TRIFENATATE SCH EACH: 100; 25 POWDER RESPIRATORY (INHALATION) at 09:07

## 2023-11-04 RX ADMIN — Medication SCH MG: at 08:33

## 2023-11-04 RX ADMIN — FOLIC ACID SCH MG: 1 TABLET ORAL at 08:33

## 2023-11-04 RX ADMIN — DEXTROSE SCH MLS/HR: 50 INJECTION, SOLUTION INTRAVENOUS at 05:05

## 2023-11-04 RX ADMIN — ALBUTEROL SULFATE SCH MG: 2.5 SOLUTION RESPIRATORY (INHALATION) at 07:35

## 2023-11-04 RX ADMIN — IPRATROPIUM BROMIDE SCH MG: 0.5 SOLUTION RESPIRATORY (INHALATION) at 13:47

## 2023-11-04 RX ADMIN — CITALOPRAM HYDROBROMIDE SCH MG: 20 TABLET, FILM COATED ORAL at 08:33

## 2023-11-04 RX ADMIN — Medication SCH TAB: at 08:34

## 2023-11-04 RX ADMIN — AMLODIPINE BESYLATE SCH MG: 5 TABLET ORAL at 08:32

## 2023-11-04 RX ADMIN — IPRATROPIUM BROMIDE SCH MG: 0.5 SOLUTION RESPIRATORY (INHALATION) at 07:35

## 2023-11-04 RX ADMIN — LOSARTAN POTASSIUM SCH MG: 50 TABLET, FILM COATED ORAL at 08:33

## 2023-11-04 RX ADMIN — ASPIRIN SCH MG: 81 TABLET, CHEWABLE ORAL at 08:33

## 2023-11-04 RX ADMIN — ALBUTEROL SULFATE SCH MG: 2.5 SOLUTION RESPIRATORY (INHALATION) at 13:47
